# Patient Record
Sex: FEMALE | Race: AMERICAN INDIAN OR ALASKA NATIVE | NOT HISPANIC OR LATINO | Employment: FULL TIME | URBAN - METROPOLITAN AREA
[De-identification: names, ages, dates, MRNs, and addresses within clinical notes are randomized per-mention and may not be internally consistent; named-entity substitution may affect disease eponyms.]

---
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
## 2019-01-08 ENCOUNTER — NURSE TRIAGE (OUTPATIENT)
Dept: NURSING | Facility: CLINIC | Age: 19
End: 2019-01-08

## 2019-01-08 ENCOUNTER — OFFICE VISIT (OUTPATIENT)
Dept: FAMILY MEDICINE | Facility: CLINIC | Age: 19
End: 2019-01-08
Payer: COMMERCIAL

## 2019-01-08 VITALS
BODY MASS INDEX: 26.26 KG/M2 | DIASTOLIC BLOOD PRESSURE: 92 MMHG | SYSTOLIC BLOOD PRESSURE: 142 MMHG | HEART RATE: 101 BPM | TEMPERATURE: 98.2 F | WEIGHT: 153 LBS

## 2019-01-08 DIAGNOSIS — R03.0 ELEVATED BP WITHOUT DIAGNOSIS OF HYPERTENSION: ICD-10-CM

## 2019-01-08 DIAGNOSIS — K21.9 GASTROESOPHAGEAL REFLUX DISEASE, ESOPHAGITIS PRESENCE NOT SPECIFIED: Primary | ICD-10-CM

## 2019-01-08 DIAGNOSIS — Z30.42 SURVEILLANCE FOR INJECTABLE MEDROXYPROGESTERONE/ESTRADIOL: Primary | ICD-10-CM

## 2019-01-08 PROCEDURE — 99214 OFFICE O/P EST MOD 30 MIN: CPT | Performed by: PHYSICIAN ASSISTANT

## 2019-01-08 RX ORDER — SUCRALFATE ORAL 1 G/10ML
1 SUSPENSION ORAL 4 TIMES DAILY
Qty: 420 ML | Refills: 3 | Status: SHIPPED | OUTPATIENT
Start: 2019-01-08 | End: 2019-01-22

## 2019-01-08 RX ORDER — OMEPRAZOLE 40 MG/1
40 CAPSULE, DELAYED RELEASE ORAL DAILY
Qty: 30 CAPSULE | Refills: 0 | Status: SHIPPED | OUTPATIENT
Start: 2019-01-08 | End: 2019-02-07

## 2019-01-08 NOTE — PROGRESS NOTES
SUBJECTIVE:   Alfreda Hernandez is a 18 year old female who presents to clinic today for the following health issues:      GERD/Heartburn      Duration: X 3 week    Description (location/character/radiation): upper abdomen - burning sensation    Intensity:  moderate    Accompanying signs and symptoms:  food getting stuck: no   nausea/vomiting/blood: YES- nausea  abdominal pain: YES  black/tarry or bloody stools: no :    History (similar episodes/previous evaluation): None    Precipitating or alleviating factors:  worse with food.  current NSAID/Aspirin use: no     Therapies tried and outcome: Tums   No dysuria.  No back pain.    Worse with caffeine intake.  Did vomit 2 times in the 3 weeks.  No blood in her vomit.  No dizziness.  No constipation or diarrhea.  Tried Tums without relief.          Allergies   Allergen Reactions     Nkda [No Known Drug Allergies]        Past Medical History:   Diagnosis Date     Acanthosis nigricans      Amblyopia      Bipolar 2 disorder (H) 1/20/2016     Episodic mood disorder (H) 9/28/2015    9/15: Being seen by psychiatry at [facility] and [facility].  On Abilify and something else for her anxiety (she cannot remember which medication.)     PTSD (post-traumatic stress disorder) 1/20/2016         Current Outpatient Medications on File Prior to Visit:  Amphetamine-Dextroamphetamine (ADDERALL PO) Take 20 mg by mouth 2 times daily   ARIPIPRAZOLE PO Take 2 mg by mouth daily   busPIRone (BUSPAR) 15 MG tablet Take 1 tablet (15 mg) by mouth 2 times daily   cyproheptadine (PERIACTIN) 4 MG tablet    ketorolac (TORADOL) 10 MG tablet Take 1 tablet (10 mg) by mouth every 6 hours as needed for moderate pain   LamoTRIgine (LAMICTAL) 250 MG ER tablet Take 250 mg by mouth At Bedtime   medroxyPROGESTERone (DEPO-PROVERA) 150 MG/ML injection Inject 1 mL (150 mg) into the muscle every 3 months     No current facility-administered medications on file prior to visit.     Social History     Tobacco Use      Smoking status: Never Smoker     Smokeless tobacco: Never Used     Tobacco comment: non smoking household   Substance Use Topics     Alcohol use: No     Alcohol/week: 0.0 oz     Drug use: No       ROS:  General: negative for fever  Resp: negative for chest pain   CV: negative for chest pain  ABD: as above  : negative for dysuria  Neurologic:negative for Headache  GI-negative    OBJECTIVE:  BP (!) 142/92   Pulse 101   Temp 98.2  F (36.8  C) (Oral)   Wt 69.4 kg (153 lb)   BMI 26.26 kg/m     General:   awake, alert, and cooperative.  NAD.   Head: Normocephalic, atraumatic.  Eyes: Conjunctiva clear, non icteric.   Heart: Regular rate and rhythm. No murmur.  Lungs: Chest is clear; no wheezes or rales.  ABD: soft, mild epigastric  tenderness to palpation , no rigidity, guarding or rebound , bowel sounds intact  Neuro: Alert and oriented - normal speech.     ASSESSMENT:well appearing    ICD-10-CM    1. Gastroesophageal reflux disease, esophagitis presence not specified K21.9 sucralfate (CARAFATE) 1 GM/10ML suspension     omeprazole (PRILOSEC) 40 MG DR capsule   2. Elevated BP without diagnosis of hypertension R03.0        PLAN: Prilosec and Carafate prescribed.  Avoid caffeine.  Avoid alcohol.     Recheck BP at next visit.  Follow-up with primary 2 weeks.  Advised about symptoms which might herald more serious problems.        Chana White PA-C

## 2019-01-08 NOTE — PATIENT INSTRUCTIONS
Patient Education     GERD (Adult)    The esophagus is a tube that carries food from the mouth to the stomach. A valve (the LES, lower esophageal sphincter) at the lower end of the esophagus prevents stomach acid from flowing upward. When this valve doesn't work properly, stomach contents may repeatedly flow back up (reflux) into the esophagus. This is called gastroesophageal reflux disease (GERD). GERD can irritate the esophagus. It can cause problems with pain, swallowing or breathing. In severe cases, GERD can cause recurrent pneumonia (from aspiration or breathing in particles) or other serious problems.  Symptoms of reflux include burning, pressure or sharp pain in the upper abdomen or mid to lower chest. The pain can spread to the neck, back, or shoulder. There may be belching, an acid taste in the back of the throat, chronic cough, or sore throat, or hoarseness. GERD symptoms often occur during the day after a big meal. They can also occur at night when lying down.   Home care  Lifestyle changes can help reduce symptoms. If needed, your healthcare provider may prescribe medicines. Symptoms often improve with treatment, but if treatment is stopped, the symptoms often return after a few months. So most persons with GERD will need to continue treatment or get treatment on and off.  Lifestyle changes    Limit or avoid fatty, fried, and spicy foods, as well as coffee, chocolate, mint, and foods with high acid content such as tomatoes and citrus fruit and juices (orange, grapefruit, lemon).    Don t eat large meals, especially at night. Frequent, smaller meals are best. Don't lie down right after eating. And don t eat anything 3 hours before going to bed.    Don't drink alcohol or smoke. As much as possible, stay away from second hand smoke.    If you are overweight, losing weight will reduce symptoms.     Don't wear tight clothing around your stomach area.    If your symptoms occur during sleep, use a foam wedge  "to elevate your upper body (not just your head.) Or, place 4\" blocks under the head of your bed. Or use 2 bed risers under your bedframe.  Medicines  If needed, medicines can help relieve the symptoms of GERD and prevent damage to the esophagus. Discuss a medicine plan with your healthcare provider. This may include one or more of the following medicines:    Antacids to help neutralize the normal acids in your stomach.    Acid blockers (Histamine or H2 blockers) to decrease acid production.    Acid inhibitors (proton pump inhibitors PPIs) to decrease acid production in a different way than the blockers. They may work better, but can take a little longer to take effect.  Take an antacid 30 to 60 minutes after eating and at bedtime, but not at the same time as an acid blocker.  Try not to take medicines such as ibuprofen and aspirin. If you are taking aspirin for your heart or other medical reasons, talk to your healthcare provider about stopping it.  Follow-up care  Follow up with your healthcare provider or as advised by our staff.  When to seek medical advice  Call your healthcare provider if any of the following occur:    Stomach pain gets worse or moves to the lower right abdomen (appendix area)    Chest pain appears or gets worse, or spreads to the back, neck, shoulder, or arm    An over-the-counter trial of medicine doesn't relieve your symptoms    Weight loss that can't be explained    Trouble or pain swallowing    Frequent vomiting (can t keep down liquids)    Blood in the stool or vomit (red or black in color)    Feeling weak or dizzy    Fever of 100.4 F (38 C) or higher, or as directed by your healthcare provider  Date Last Reviewed: 3/1/2018    [id number] [employer]. [address] [place] [address], [place] [address]. All rights reserved. This information is not intended as a substitute for professional medical care. Always follow your healthcare professional's instructions.           "

## 2019-01-08 NOTE — TELEPHONE ENCOUNTER
FNA triage call :   Presenting problem : Pt called.  Constant  Burning in upper abdomen started few weeks , worse with eating . Currently : no fever  or injury , but constant , Upper abdominal pain is  6/10 on painscale .   Guideline used :  abd pain - upper - adult   Disposition and recommendations :  NPO , have someone drive you to [facility] ED but Pt prefers appt only and sent to .   Caller verbalizes understanding and denies further questions and will call back if further symptoms to triage or questions  . Josie Driscoll RN  - [facility] Nurse Advisor       Reason for Disposition    [1] SEVERE pain (e.g., excruciating) AND [2] present > 1 hour    Additional Information    Negative: Severe difficulty breathing (e.g., struggling for each breath, speaks in single words)    Negative: Shock suspected (e.g., cold/pale/clammy skin, too weak to stand, low BP, rapid pulse)    Negative: Difficult to awaken or acting confused  (e.g., disoriented, slurred speech)    Negative: Passed out (i.e., lost consciousness, collapsed and was not responding)    Negative: Visible sweat on face or sweat dripping down face    Negative: Sounds like a life-threatening emergency to the triager    Negative: Followed an abdomen (stomach) injury    Negative: Chest pain    Protocols used: ABDOMINAL PAIN - UPPER-ADULT-

## 2019-01-09 ENCOUNTER — ALLIED HEALTH/NURSE VISIT (OUTPATIENT)
Dept: NURSING | Facility: CLINIC | Age: 19
End: 2019-01-09
Payer: COMMERCIAL

## 2019-01-09 VITALS — DIASTOLIC BLOOD PRESSURE: 86 MMHG | BODY MASS INDEX: 26.61 KG/M2 | SYSTOLIC BLOOD PRESSURE: 140 MMHG | WEIGHT: 155 LBS

## 2019-01-09 DIAGNOSIS — Z30.42 SURVEILLANCE FOR INJECTABLE MEDROXYPROGESTERONE/ESTRADIOL: Primary | ICD-10-CM

## 2019-01-09 PROCEDURE — 99207 ZZC NO CHARGE NURSE ONLY: CPT

## 2019-01-09 PROCEDURE — 96372 THER/PROPH/DIAG INJ SC/IM: CPT

## 2019-01-09 RX ORDER — MEDROXYPROGESTERONE ACETATE 150 MG/ML
150 INJECTION, SUSPENSION INTRAMUSCULAR
Status: DISCONTINUED | OUTPATIENT
Start: 2019-01-09 | End: 2019-09-27

## 2019-01-09 RX ADMIN — MEDROXYPROGESTERONE ACETATE 150 MG: 150 INJECTION, SUSPENSION INTRAMUSCULAR at 11:22

## 2019-05-14 ENCOUNTER — OFFICE VISIT (OUTPATIENT)
Dept: PEDIATRICS | Facility: CLINIC | Age: 19
End: 2019-05-14
Payer: COMMERCIAL

## 2019-05-14 VITALS
WEIGHT: 166 LBS | HEIGHT: 64 IN | SYSTOLIC BLOOD PRESSURE: 126 MMHG | BODY MASS INDEX: 28.34 KG/M2 | HEART RATE: 115 BPM | OXYGEN SATURATION: 96 % | TEMPERATURE: 98.5 F | DIASTOLIC BLOOD PRESSURE: 72 MMHG

## 2019-05-14 DIAGNOSIS — Z30.09 BIRTH CONTROL COUNSELING: Primary | ICD-10-CM

## 2019-05-14 PROBLEM — M26.609 TEMPOROMANDIBULAR JOINT DISORDER: Status: ACTIVE | Noted: 2018-02-09

## 2019-05-14 PROBLEM — K11.7 XEROSTOMIA: Status: ACTIVE | Noted: 2018-02-09

## 2019-05-14 PROBLEM — M79.18 MYOFASCIAL PAIN: Status: ACTIVE | Noted: 2018-02-09

## 2019-05-14 PROBLEM — G44.209 TENSION-TYPE HEADACHE: Status: ACTIVE | Noted: 2018-02-09

## 2019-05-14 LAB — HCG UR QL: NEGATIVE

## 2019-05-14 PROCEDURE — 87491 CHLMYD TRACH DNA AMP PROBE: CPT | Performed by: NURSE PRACTITIONER

## 2019-05-14 PROCEDURE — 99214 OFFICE O/P EST MOD 30 MIN: CPT | Mod: 25 | Performed by: NURSE PRACTITIONER

## 2019-05-14 PROCEDURE — 81025 URINE PREGNANCY TEST: CPT | Performed by: NURSE PRACTITIONER

## 2019-05-14 PROCEDURE — 87591 N.GONORRHOEAE DNA AMP PROB: CPT | Performed by: NURSE PRACTITIONER

## 2019-05-14 PROCEDURE — 96372 THER/PROPH/DIAG INJ SC/IM: CPT | Performed by: NURSE PRACTITIONER

## 2019-05-14 RX ORDER — MEDROXYPROGESTERONE ACETATE 150 MG/ML
150 INJECTION, SUSPENSION INTRAMUSCULAR
Status: DISCONTINUED | OUTPATIENT
Start: 2019-05-14 | End: 2019-09-27

## 2019-05-14 RX ADMIN — MEDROXYPROGESTERONE ACETATE 150 MG: 150 INJECTION, SUSPENSION INTRAMUSCULAR at 14:00

## 2019-05-14 ASSESSMENT — MIFFLIN-ST. JEOR: SCORE: 1516.94

## 2019-05-14 NOTE — PATIENT INSTRUCTIONS
Results for orders placed or performed in visit on 05/14/19   HCG Qual, Urine ([id number])   Result Value Ref Range    HCG Qual Urine Negative NEG^Negative     Depo every 3 months    NEXT INJECTION DUE: 7/30/19 - 8/13/19

## 2019-05-14 NOTE — NURSING NOTE
BP: 148/84    LAST PAP/EXAM: No results found for: PAP  URINE HCG:negative    The following medication was given:     MEDICATION: Depo Provera 150mg  ROUTE: IM  SITE: LUQ - Gluteus  : [employer]  LOT #: [id number]  EXP:[id number]  NEXT INJECTION DUE: 7/30/19 - 8/13/19   Provider: [facility]

## 2019-05-14 NOTE — PROGRESS NOTES
SUBJECTIVE:   Alfreda Hernandez is a 18 year old female who presents to clinic today because of:    Chief Complaint   Patient presents with     Contraception        HPI  Concerns: Patient is in clinic to get her depo shot.   NANCY Daily      Here today to restart her Depo she missed her April shot she has been busy and was unable to make it to the clinic.   She is sexually active and sometimes will use a condom not always.  She does want to continue with the Depo and likes how it work.           ROS  GENERAL:  NEGATIVE for fever, poor appetite, and sleep disruption.  SKIN:  NEGATIVE for rash, hives, and eczema.  EYE:  NEGATIVE for pain, discharge, redness, itching and vision problems.  ENT:  NEGATIVE for ear pain, runny nose, congestion and sore throat.  RESP:  NEGATIVE for cough, wheezing, and difficulty breathing.  CARDIAC:  NEGATIVE for chest pain and cyanosis.   GI:  NEGATIVE for vomiting, diarrhea, abdominal pain and constipation.  :  NEGATIVE for urinary problems.  NEURO:  NEGATIVE for headache and weakness.  ALLERGY:  As in Allergy History  MSK:  NEGATIVE for muscle problems and joint problems.    PROBLEM LIST  Patient Active Problem List    Diagnosis Date Noted     Intractable migraine without aura and with status migrainosus 12/01/2016     Priority: Medium     Bipolar 2 disorder (H) 01/20/2016     Priority: Medium     PTSD (post-traumatic stress disorder) 01/20/2016     Priority: Medium     Episodic mood disorder (H) 09/28/2015     Priority: Medium     9/15: Being seen by psychiatry at [facility].  On Abilify and something else for her anxiety (she cannot remember which medication.)       Panic disorder without agoraphobia 05/07/2015     Priority: Medium     Contraception 03/09/2015     Priority: Medium     Moderate recurrent major depression (H) 10/22/2014     Priority: Medium     Anxiety 10/22/2014     Priority: Medium     Panic attack 10/22/2014     Priority: Medium     Vitamin D  "deficiency 10/10/2014     Priority: Medium      MEDICATIONS  Current Outpatient Medications   Medication Sig Dispense Refill     Amphetamine-Dextroamphetamine (ADDERALL PO) Take 20 mg by mouth 2 times daily       ARIPIPRAZOLE PO Take 2 mg by mouth daily       busPIRone (BUSPAR) 15 MG tablet Take 1 tablet (15 mg) by mouth 2 times daily 180 tablet 3     cyproheptadine (PERIACTIN) 4 MG tablet        ketorolac (TORADOL) 10 MG tablet Take 1 tablet (10 mg) by mouth every 6 hours as needed for moderate pain 12 tablet 0     LamoTRIgine (LAMICTAL) 250 MG ER tablet Take 250 mg by mouth At Bedtime       medroxyPROGESTERone (DEPO-PROVERA) 150 MG/ML injection Inject 1 mL (150 mg) into the muscle every 3 months (Patient not taking: Reported on 5/14/2019) 1 mL 3      ALLERGIES  Allergies   Allergen Reactions     Nkda [No Known Drug Allergies]        Reviewed and updated as needed this visit by clinical staff  Tobacco  Allergies  Meds  Med Hx  Surg Hx  Fam Hx         Reviewed and updated as needed this visit by Provider  Tobacco  Med Hx  Surg Hx  Fam Hx       OBJECTIVE:   /72   Pulse 115   Temp 98.5  F (36.9  C) (Oral)   Ht 5' 4.25\" (1.632 m)   Wt 166 lb (75.3 kg)   SpO2 96%   BMI 28.27 kg/m    49 %ile based on CDC (Girls, 2-20 Years) Stature-for-age data based on Stature recorded on 5/14/2019.  90 %ile based on CDC (Girls, 2-20 Years) weight-for-age data based on Weight recorded on 5/14/2019.  90 %ile based on CDC (Girls, 2-20 Years) BMI-for-age based on body measurements available as of 5/14/2019.  Blood pressure percentiles are not available for patients who are 18 years or older.    GENERAL: Active, alert, in no acute distress.  SKIN: Clear. No significant rash, abnormal pigmentation or lesions  HEAD: Normocephalic.  EYES:  No discharge or erythema. Normal pupils and EOM.  EARS: Normal canals. Tympanic membranes are normal; gray and translucent.  NOSE: Normal without discharge.  MOUTH/THROAT: Clear. No " oral lesions. Teeth intact without obvious abnormalities.  NECK: Supple, no masses.  LYMPH NODES: No adenopathy  LUNGS: Clear. No rales, rhonchi, wheezing or retractions  HEART: Regular rhythm. Normal S1/S2. No murmurs.  ABDOMEN: Soft, non-tender, not distended, no masses or hepatosplenomegaly. Bowel sounds normal.     DIAGNOSTICS:   Results for orders placed or performed in visit on 05/14/19 (from the past 24 hour(s))   HCG Qual, Urine ([id number])   Result Value Ref Range    HCG Qual Urine Negative NEG^Negative       ASSESSMENT/PLAN:   (Z30.09) Birth control counseling  (primary encounter diagnosis)  Comment:   Plan: HCG Qual, Urine ([id number]), NEISSERIA GONORRHOEA        PCR, CHLAMYDIA TRACHOMATIS PCR,         medroxyPROGESTERone (DEPO-PROVERA) syringe 150         mg        Will continue Depo every 3 months.   Discussed if interested at any time to try the IUD or Nexplanon then would refer to Josie Angeles NP.      FOLLOW UP: in 3 month(s)    DAVID Kaye, APRN CNP

## 2019-05-15 ENCOUNTER — TELEPHONE (OUTPATIENT)
Dept: PEDIATRICS | Facility: CLINIC | Age: 19
End: 2019-05-15

## 2019-05-15 DIAGNOSIS — A74.9 CHLAMYDIA INFECTION: Primary | ICD-10-CM

## 2019-05-15 LAB
C TRACH DNA SPEC QL NAA+PROBE: POSITIVE
N GONORRHOEA DNA SPEC QL NAA+PROBE: NEGATIVE
SPECIMEN SOURCE: ABNORMAL
SPECIMEN SOURCE: NORMAL

## 2019-05-15 RX ORDER — AZITHROMYCIN 500 MG/1
1000 TABLET, FILM COATED ORAL DAILY
Qty: 2 TABLET | Refills: 0 | Status: SHIPPED | OUTPATIENT
Start: 2019-05-15 | End: 2019-09-04

## 2019-05-15 NOTE — TELEPHONE ENCOUNTER
Kathy from lab called, Patient Alfreda is positive for Chlamydia Please advise.     Kristen Melgar MA

## 2019-05-15 NOTE — TELEPHONE ENCOUNTER
VM left for PATIENT to call clinic and mychart sent with instructions on taking Zithromax and  Sexual partner treatment -- Treatment is important for you and anyone you have had sex with recently (the last 60 days, or the last person you had sex with), whether or not he or she has symptoms or has a negative test for chlamydia. This is because not all infections cause symptoms, and because the test might not detect a recent infection, which could have been the source of your infection. Your doctor or nurse might ask you to tell your sexual partner(s) to be treated. In some cases, your doctor or nurse may give you a prescription for both you and your partner.  You should not have sex until one week passes after both you and your partner have completed treatment. It is possible to be infected with chlamydia more than once, and the most common reason for this is failure to treat sexual partners.      Emma Swift, APRN, CNP

## 2019-05-23 ENCOUNTER — TELEPHONE (OUTPATIENT)
Dept: PEDIATRICS | Facility: CLINIC | Age: 19
End: 2019-05-23

## 2019-05-23 NOTE — TELEPHONE ENCOUNTER
STD reporting form filled out for the [place] Dept of Health and faxed to [phone].    Margaret INFANTEN, RN

## 2019-05-23 NOTE — TELEPHONE ENCOUNTER
----- Message from Isidro Pereira sent at 5/23/2019  2:16 PM CDT -----  Regarding: MD reportable disease  This patient was positive for Chlamydia on 5/14/19. Please report this to MD if it has not already been completed. Thank you.

## 2019-06-17 ENCOUNTER — OFFICE VISIT (OUTPATIENT)
Dept: OBGYN | Facility: CLINIC | Age: 19
End: 2019-06-17
Payer: COMMERCIAL

## 2019-06-17 VITALS
OXYGEN SATURATION: 97 % | HEART RATE: 95 BPM | BODY MASS INDEX: 27.73 KG/M2 | DIASTOLIC BLOOD PRESSURE: 91 MMHG | WEIGHT: 162.8 LBS | SYSTOLIC BLOOD PRESSURE: 145 MMHG

## 2019-06-17 DIAGNOSIS — Z11.3 SCREEN FOR STD (SEXUALLY TRANSMITTED DISEASE): Primary | ICD-10-CM

## 2019-06-17 PROCEDURE — 99202 OFFICE O/P NEW SF 15 MIN: CPT | Performed by: NURSE PRACTITIONER

## 2019-06-17 PROCEDURE — 87591 N.GONORRHOEAE DNA AMP PROB: CPT | Performed by: NURSE PRACTITIONER

## 2019-06-17 PROCEDURE — 87491 CHLMYD TRACH DNA AMP PROBE: CPT | Performed by: NURSE PRACTITIONER

## 2019-06-17 NOTE — PROGRESS NOTES
Subjective     Alfreda Hernandez is a 18 year old female who presents to clinic today for the following health issues:    HPI   Follow up Chlamydia.  Patient was seen a month ago by Pediatrics and had STI screening-positive for Chlamydia. Was treated and per patient, partner received treatment from his provider. Denies abnormal vaginal discharge, pelvic pain, nausea, fever. Declines need for screening for other STI except gonorrhea and chlamydia.  Took the medication and had no adverse side effects. No other concerns today.  Using condoms on occasion.  Depo Provera for contraception.  Blood pressures have been elevated on last few visits.    Patient Active Problem List   Diagnosis     Vitamin D deficiency     Moderate recurrent major depression (H)     Anxiety     Panic attack     Contraception     Panic disorder without agoraphobia     Episodic mood disorder (H)     Bipolar 2 disorder (H)     PTSD (post-traumatic stress disorder)     Intractable migraine without aura and with status migrainosus     Myofascial pain     Temporomandibular joint disorder     Tension-type headache     Xerostomia     Past Surgical History:   Procedure Laterality Date     PE TUBES       TONSILLECTOMY, ADENOIDECTOMY, COMBINED Bilateral 12/26/2016    Procedure: COMBINED TONSILLECTOMY, ADENOIDECTOMY;  Surgeon: Olvin Raymond MD;  Location:  OR       Social History     Tobacco Use     Smoking status: Never Smoker     Smokeless tobacco: Never Used     Tobacco comment: non smoking household   Substance Use Topics     Alcohol use: No     Alcohol/week: 0.0 oz     Family History   Problem Relation Age of Onset     Asthma Mother      Thyroid Disease Mother         resolved shortly after pregnancy     Other - See Comments Mother         Dx with Lupus on 11/2014     Hypertension Father      Diabetes Maternal Grandfather      Heart Disease Other         maternal great grandparents     Breast Cancer Other         maternal great aunt      Cerebrovascular Disease No family hx of      Glaucoma No family hx of      Macular Degeneration No family hx of            Reviewed and updated as needed this visit by Provider  Tobacco  Allergies  Meds  Problems  Med Hx  Surg Hx  Fam Hx  Soc Hx          Review of Systems   ROS COMP: Constitutional, HEENT, cardiovascular, pulmonary, gi and gu systems are negative, except as otherwise noted.      Objective    BP (!) 145/91   Pulse 95   Wt 73.8 kg (162 lb 12.8 oz)   SpO2 97%   BMI 27.73 kg/m    Body mass index is 27.73 kg/m .  Physical Exam   GENERAL: healthy, alert and no distress   (female): normal female external genitalia, normal urethral meatus, vaginal mucosa, normal cervix/adnexa/uterus without masses or discharge  MS: no gross musculoskeletal defects noted, no edema  SKIN: no suspicious lesions or rashes  PSYCH: mentation appears normal, affect normal/bright    Assessment & Plan     1. Screen for STD (sexually transmitted disease)  Await results. Encouraged consistent condom use.   Also, recommend follow up with PCP due to elevated blood pressures on several occasions.  - Chlamydia trachomatis PCR  - Neisseria gonorrhoeae PCR     CHEKO Schmitt CNP  [facility]

## 2019-06-18 LAB
C TRACH DNA SPEC QL NAA+PROBE: NEGATIVE
N GONORRHOEA DNA SPEC QL NAA+PROBE: NEGATIVE
SPECIMEN SOURCE: NORMAL
SPECIMEN SOURCE: NORMAL

## 2019-08-20 ENCOUNTER — ALLIED HEALTH/NURSE VISIT (OUTPATIENT)
Dept: NURSING | Facility: CLINIC | Age: 19
End: 2019-08-20
Payer: COMMERCIAL

## 2019-08-20 VITALS
WEIGHT: 156 LBS | RESPIRATION RATE: 16 BRPM | SYSTOLIC BLOOD PRESSURE: 99 MMHG | DIASTOLIC BLOOD PRESSURE: 70 MMHG | HEART RATE: 77 BPM | TEMPERATURE: 99.1 F | BODY MASS INDEX: 26.57 KG/M2 | OXYGEN SATURATION: 95 %

## 2019-08-20 LAB — HCG UR QL: NEGATIVE

## 2019-08-20 PROCEDURE — 96372 THER/PROPH/DIAG INJ SC/IM: CPT

## 2019-08-20 PROCEDURE — 81025 URINE PREGNANCY TEST: CPT | Performed by: PEDIATRICS

## 2019-08-20 PROCEDURE — 99207 ZZC NO CHARGE NURSE ONLY: CPT

## 2019-08-20 RX ADMIN — MEDROXYPROGESTERONE ACETATE 150 MG: 150 INJECTION, SUSPENSION INTRAMUSCULAR at 18:25

## 2019-08-20 NOTE — PROGRESS NOTES
Clinic Administered Medication Documentation      Depo Provera Documentation    Prior to injection, verified patient identity using patient's name and date of birth. Medication was administered. Please see MAR and medication order for additional information. Patient instructed to remain in clinic for 15 minutes and report any adverse reaction to staff immediately .    BP: 99/70    LAST PAP/EXAM: No results found for: PAP  URINE HCG:negative    NEXT INJECTION DUE: 11/5/19 - 11/19/19    Was entire vial of medication used? Yes  Vial/Syringe: Single dose vial  Expiration Date:  06/2020    BP 99/70   Pulse 77   Temp 99.1  F (37.3  C) (Tympanic)   Resp 16   Wt 70.8 kg (156 lb)   SpO2 95%   BMI 26.57 kg/m        Padmini Martin, CMA

## 2019-08-24 ENCOUNTER — OFFICE VISIT (OUTPATIENT)
Dept: URGENT CARE | Facility: URGENT CARE | Age: 19
End: 2019-08-24
Payer: COMMERCIAL

## 2019-08-24 VITALS
RESPIRATION RATE: 18 BRPM | DIASTOLIC BLOOD PRESSURE: 70 MMHG | OXYGEN SATURATION: 98 % | BODY MASS INDEX: 26.09 KG/M2 | SYSTOLIC BLOOD PRESSURE: 130 MMHG | TEMPERATURE: 98.8 F | HEART RATE: 95 BPM | WEIGHT: 153.2 LBS

## 2019-08-24 DIAGNOSIS — R03.0 ELEVATED BLOOD PRESSURE READING WITHOUT DIAGNOSIS OF HYPERTENSION: ICD-10-CM

## 2019-08-24 DIAGNOSIS — R07.0 THROAT PAIN: Primary | ICD-10-CM

## 2019-08-24 DIAGNOSIS — H66.002 ACUTE SUPPURATIVE OTITIS MEDIA OF LEFT EAR WITHOUT SPONTANEOUS RUPTURE OF TYMPANIC MEMBRANE, RECURRENCE NOT SPECIFIED: ICD-10-CM

## 2019-08-24 LAB
DEPRECATED S PYO AG THROAT QL EIA: NORMAL
SPECIMEN SOURCE: NORMAL

## 2019-08-24 PROCEDURE — 99214 OFFICE O/P EST MOD 30 MIN: CPT | Performed by: FAMILY MEDICINE

## 2019-08-24 PROCEDURE — 87880 STREP A ASSAY W/OPTIC: CPT | Performed by: FAMILY MEDICINE

## 2019-08-24 PROCEDURE — 87081 CULTURE SCREEN ONLY: CPT | Performed by: FAMILY MEDICINE

## 2019-08-24 RX ORDER — AMOXICILLIN 500 MG/1
1000 TABLET, FILM COATED ORAL 2 TIMES DAILY
Qty: 40 TABLET | Refills: 0 | Status: SHIPPED | OUTPATIENT
Start: 2019-08-24 | End: 2019-09-04

## 2019-08-24 NOTE — PROGRESS NOTES
Chief complaint: sore throat    Denies any possibility of pregnancy declined a pregnancy test    2 days ago felt dry throat and scratchy  Today hurts more   Both ears hurt too  Stuffy and congestion   Feeling feverish not documented  Patient took tylenol today  BP elevated today but asymptomatic. No headache no blurring of vision no chest pain no shortness of breath no dizziness no unsteadiness no numbness no weakness  able to swallow liquids and solids -YES  other symptoms above  Rash: No  Has tried over the counter medications no relief  because of persistence, patient came in to be seen.    ROS:  denies any exertional chest pain or shortness of breath  denies any unusual rash or joint swelling  denies post-tussive emesis or pertussis like symptoms  Negative for constitutional, eye, ear, nose, throat, skin, respiratory, cardiac, and gastrointestinal other than those outlined in the HPI.    PMH: chart reviewed  FH: chart reviewed    SH: chart reviewed and as above   Physical Exam:   /70   Pulse 95   Temp 98.8  F (37.1  C) (Oral)   Resp 18   Wt 69.5 kg (153 lb 3.2 oz)   SpO2 98%   BMI 26.09 kg/m    General : Awake Alert not in any acute cardiorespiratory distress  Head:       Normocephalic Atraumatic  Eyes:    Pupils equally reactive to light and accomodation. Sclera not icteric.   ENT:   midline nasal septum, mild nasal congestion, sinuses non-tender  left ear: no tragal tenderness, no mastoid tenderness, normal EAC, erythematous bulging tympaninc membrane   right ear: left ear: no tragal tenderness, no mastoid tenderness, normal EAC, normal TM  mouth moist buccal mucosa, Yes hyperemic posterior pharyngeal wall, no trismus  tonsils: tonsils are absent  anterior cervical nodes: Yes tender  posterior cervical nodes: No  palpable  Heart:  Regular in rate and rhythm, no murmurs rubs or gallops  Lungs: Symmetrical Chest Expansion, no retractions, clear breath sounds  Abdomen: soft, no  hepatosplenomegally  Psych: Appropriate mood and affect. Pleasant  No thoughts of harming self or others  Feels safe   Skin: patient undressed to level of his/her comfort. No visible concerning lesions.    Labs: Strep negative       ICD-10-CM    1. Throat pain R07.0 Strep, Rapid Screen     Beta strep group A culture   2. Elevated blood pressure reading without diagnosis of hypertension R03.0    3. Acute suppurative otitis media of left ear without spontaneous rupture of tympanic membrane, recurrence not specified H66.002 amoxicillin (AMOXIL) 500 MG tablet     Prescribed with amoxicillin   supportive treatment: advised supportive treatment, Advised to come back in if with any worsening symptoms or if not better despite supportive measures. Especially if with any worsening sore throat, inability to eat or drink or swallow, or trismus. Symptoms of peritonsillar abscess discussed. Patient voiced understanding.  adverse reactions of medication discussed  OTC medications discussed  advised to come back in right away if with any worsening symptoms or if with no relief despite treatment plan  patient voiced understanding and had no further questions at this time.    Your blood pressure reading was elevated today.  Recommend that you have it re-checked either at home or at our clinic within a week  Avoid cold medicines that have pseudoephedrin in it, or Sudafed. You may take regular or plain Robitussin or Mucinex  If you are unsure, please ask the pharmacist    If your blood pressure top number (systolic) 180 and above, or the bottom number (diastolic) 120 and above, you need to be seen immediately.  If persistently elevated top number 140 and above, or the bottom number 90 and above, please schedule an appointment to see a provider in clinic within a week.  If you have very elevated blood pressures, accompanied by headache, chest pain, numbness, weakness, slurring of speech, confusion, difficulty walking, call 911 and go to  the ER

## 2019-08-25 LAB
BACTERIA SPEC CULT: NORMAL
SPECIMEN SOURCE: NORMAL

## 2019-09-04 ENCOUNTER — OFFICE VISIT (OUTPATIENT)
Dept: PEDIATRICS | Facility: CLINIC | Age: 19
End: 2019-09-04
Payer: COMMERCIAL

## 2019-09-04 VITALS
TEMPERATURE: 98.8 F | WEIGHT: 151 LBS | SYSTOLIC BLOOD PRESSURE: 124 MMHG | DIASTOLIC BLOOD PRESSURE: 84 MMHG | BODY MASS INDEX: 25.78 KG/M2 | HEART RATE: 92 BPM | HEIGHT: 64 IN

## 2019-09-04 DIAGNOSIS — R03.0 ELEVATED BLOOD-PRESSURE READING WITHOUT DIAGNOSIS OF HYPERTENSION: ICD-10-CM

## 2019-09-04 DIAGNOSIS — M54.41 CHRONIC BILATERAL LOW BACK PAIN WITH BILATERAL SCIATICA: Primary | ICD-10-CM

## 2019-09-04 DIAGNOSIS — G89.29 CHRONIC BILATERAL LOW BACK PAIN WITH BILATERAL SCIATICA: Primary | ICD-10-CM

## 2019-09-04 DIAGNOSIS — G43.809 OTHER MIGRAINE WITHOUT STATUS MIGRAINOSUS, NOT INTRACTABLE: ICD-10-CM

## 2019-09-04 DIAGNOSIS — M54.42 CHRONIC BILATERAL LOW BACK PAIN WITH BILATERAL SCIATICA: Primary | ICD-10-CM

## 2019-09-04 LAB
ALBUMIN SERPL-MCNC: 4.1 G/DL (ref 3.4–5)
ALBUMIN UR-MCNC: NEGATIVE MG/DL
ALP SERPL-CCNC: 163 U/L (ref 40–150)
ALT SERPL W P-5'-P-CCNC: 25 U/L (ref 0–50)
AMORPH CRY #/AREA URNS HPF: ABNORMAL /HPF
ANION GAP SERPL CALCULATED.3IONS-SCNC: 6 MMOL/L (ref 3–14)
APPEARANCE UR: ABNORMAL
AST SERPL W P-5'-P-CCNC: 16 U/L (ref 0–35)
BASOPHILS # BLD AUTO: 0 10E9/L (ref 0–0.2)
BASOPHILS NFR BLD AUTO: 0.3 %
BILIRUB SERPL-MCNC: 0.3 MG/DL (ref 0.2–1.3)
BILIRUB UR QL STRIP: NEGATIVE
BUN SERPL-MCNC: 14 MG/DL (ref 7–30)
CALCIUM SERPL-MCNC: 9.3 MG/DL (ref 8.5–10.1)
CHLORIDE SERPL-SCNC: 107 MMOL/L (ref 96–110)
CHOLEST SERPL-MCNC: 179 MG/DL
CO2 SERPL-SCNC: 25 MMOL/L (ref 20–32)
COLOR UR AUTO: YELLOW
CREAT SERPL-MCNC: 0.65 MG/DL (ref 0.5–1)
DIFFERENTIAL METHOD BLD: NORMAL
EOSINOPHIL # BLD AUTO: 0.1 10E9/L (ref 0–0.7)
EOSINOPHIL NFR BLD AUTO: 1 %
ERYTHROCYTE [DISTWIDTH] IN BLOOD BY AUTOMATED COUNT: 12.2 % (ref 10–15)
GFR SERPL CREATININE-BSD FRML MDRD: >90 ML/MIN/{1.73_M2}
GLUCOSE SERPL-MCNC: 89 MG/DL (ref 70–99)
GLUCOSE UR STRIP-MCNC: NEGATIVE MG/DL
HBA1C MFR BLD: 5.2 % (ref 0–5.6)
HCT VFR BLD AUTO: 44.5 % (ref 35–47)
HDLC SERPL-MCNC: 51 MG/DL
HGB BLD-MCNC: 14.7 G/DL (ref 11.7–15.7)
HGB UR QL STRIP: NEGATIVE
KETONES UR STRIP-MCNC: NEGATIVE MG/DL
LDLC SERPL CALC-MCNC: 113 MG/DL
LEUKOCYTE ESTERASE UR QL STRIP: NEGATIVE
LYMPHOCYTES # BLD AUTO: 2.2 10E9/L (ref 0.8–5.3)
LYMPHOCYTES NFR BLD AUTO: 24 %
MCH RBC QN AUTO: 29.9 PG (ref 26.5–33)
MCHC RBC AUTO-ENTMCNC: 33 G/DL (ref 31.5–36.5)
MCV RBC AUTO: 90 FL (ref 78–100)
MONOCYTES # BLD AUTO: 0.4 10E9/L (ref 0–1.3)
MONOCYTES NFR BLD AUTO: 4.7 %
NEUTROPHILS # BLD AUTO: 6.4 10E9/L (ref 1.6–8.3)
NEUTROPHILS NFR BLD AUTO: 70 %
NITRATE UR QL: NEGATIVE
NON-SQ EPI CELLS #/AREA URNS LPF: ABNORMAL /LPF
NONHDLC SERPL-MCNC: 128 MG/DL
PH UR STRIP: 7 PH (ref 5–7)
PLATELET # BLD AUTO: 313 10E9/L (ref 150–450)
POTASSIUM SERPL-SCNC: 3.9 MMOL/L (ref 3.4–5.3)
PROT SERPL-MCNC: 8 G/DL (ref 6.8–8.8)
RBC # BLD AUTO: 4.92 10E12/L (ref 3.8–5.2)
RBC #/AREA URNS AUTO: ABNORMAL /HPF
SODIUM SERPL-SCNC: 138 MMOL/L (ref 133–144)
SOURCE: ABNORMAL
SP GR UR STRIP: 1.01 (ref 1–1.03)
TRIGL SERPL-MCNC: 76 MG/DL
TSH SERPL DL<=0.005 MIU/L-ACNC: 0.8 MU/L (ref 0.4–4)
UROBILINOGEN UR STRIP-ACNC: 0.2 EU/DL (ref 0.2–1)
WBC # BLD AUTO: 9.1 10E9/L (ref 4–11)
WBC #/AREA URNS AUTO: ABNORMAL /HPF

## 2019-09-04 PROCEDURE — 99214 OFFICE O/P EST MOD 30 MIN: CPT | Performed by: NURSE PRACTITIONER

## 2019-09-04 PROCEDURE — 80053 COMPREHEN METABOLIC PANEL: CPT | Performed by: NURSE PRACTITIONER

## 2019-09-04 PROCEDURE — 83036 HEMOGLOBIN GLYCOSYLATED A1C: CPT | Performed by: NURSE PRACTITIONER

## 2019-09-04 PROCEDURE — 99000 SPECIMEN HANDLING OFFICE-LAB: CPT | Performed by: NURSE PRACTITIONER

## 2019-09-04 PROCEDURE — 84244 ASSAY OF RENIN: CPT | Mod: 90 | Performed by: NURSE PRACTITIONER

## 2019-09-04 PROCEDURE — 80061 LIPID PANEL: CPT | Performed by: NURSE PRACTITIONER

## 2019-09-04 PROCEDURE — 36415 COLL VENOUS BLD VENIPUNCTURE: CPT | Performed by: NURSE PRACTITIONER

## 2019-09-04 PROCEDURE — 86160 COMPLEMENT ANTIGEN: CPT | Mod: 59 | Performed by: NURSE PRACTITIONER

## 2019-09-04 PROCEDURE — 84443 ASSAY THYROID STIM HORMONE: CPT | Performed by: NURSE PRACTITIONER

## 2019-09-04 PROCEDURE — 85025 COMPLETE CBC W/AUTO DIFF WBC: CPT | Performed by: NURSE PRACTITIONER

## 2019-09-04 PROCEDURE — 81001 URINALYSIS AUTO W/SCOPE: CPT | Performed by: NURSE PRACTITIONER

## 2019-09-04 RX ORDER — DULOXETIN HYDROCHLORIDE 30 MG/1
CAPSULE, DELAYED RELEASE ORAL
Refills: 2 | COMMUNITY
Start: 2019-07-22

## 2019-09-04 RX ORDER — KETOROLAC TROMETHAMINE 10 MG/1
10 TABLET, FILM COATED ORAL EVERY 6 HOURS PRN
Qty: 12 TABLET | Refills: 1 | Status: SHIPPED | OUTPATIENT
Start: 2019-09-04 | End: 2020-02-19

## 2019-09-04 RX ORDER — DULOXETIN HYDROCHLORIDE 60 MG/1
CAPSULE, DELAYED RELEASE ORAL
Refills: 3 | COMMUNITY
Start: 2019-07-22

## 2019-09-04 RX ORDER — PROPRANOLOL HYDROCHLORIDE 40 MG/1
40 TABLET ORAL 2 TIMES DAILY
Qty: 60 TABLET | Refills: 1 | Status: SHIPPED | OUTPATIENT
Start: 2019-09-04 | End: 2020-01-27

## 2019-09-04 ASSESSMENT — MIFFLIN-ST. JEOR: SCORE: 1444.93

## 2019-09-04 NOTE — PATIENT INSTRUCTIONS
[facility] number is [phone]  Get your renal ultrasound in the next few days and once completed then start the propranolol twice daily    Follow up in 2 months

## 2019-09-04 NOTE — PROGRESS NOTES
Subjective    Alfreda Hernandez is a 18 year old female who presents to clinic today with self because of:  Hypertension and Back Pain     HPI   Concerns: blood pressure concern and back pain      Here today for concerns about her blood pressure as it has been high off and on  for a long time.  It is really inconsistent with it begin elevated.  Dad and his family all have high blood pressure and they are Native.      Concerns: back pain    She did see neurology and has physical therapy for her migraines and her neck.  She was experiencing lower back issues and the therapist told her that was sometime with her lower back but she was there for neck therapy so could not work on lower back.  Occasionally she will get pain down her leg to her on both sides, now the left side hurts more.  She is in lower back pain every day and worse in the AM and after she does a lot of activity waling or any activity for a long time.   No hx of back injuries.         Concerns:  Still gets migraines a lot.  Uses the Toradol when her headaches are really bad.      Review of Systems  GENERAL:  NEGATIVE for fever, poor appetite, and sleep disruption.  SKIN:  NEGATIVE for rash, hives, and eczema.  EYE:  NEGATIVE for pain, discharge, redness, itching and vision problems.  ENT:  As in HPI  RESP:  NEGATIVE for cough, wheezing, and difficulty breathing.  CARDIAC:  NEGATIVE for chest pain and cyanosis.   GI:  NEGATIVE for vomiting, diarrhea, abdominal pain and constipation.  :  NEGATIVE for urinary problems.  NEURO:  As in HPI  ALLERGY:  As in Allergy History  MSK:  As in HPI    Problem List  Patient Active Problem List    Diagnosis Date Noted     Myofascial pain 02/09/2018     Priority: Medium     Temporomandibular joint disorder 02/09/2018     Priority: Medium     Tension-type headache 02/09/2018     Priority: Medium     Xerostomia 02/09/2018     Priority: Medium     Bipolar 2 disorder (H) 01/20/2016     Priority: Medium     PTSD (post-traumatic  "stress disorder) 01/20/2016     Priority: Medium     Episodic mood disorder (H) 09/28/2015     Priority: Medium     9/15: Being seen by psychiatry at [facility].  On Abilify and something else for her anxiety (she cannot remember which medication.)       Panic disorder without agoraphobia 05/07/2015     Priority: Medium     Contraception 03/09/2015     Priority: Medium     Moderate recurrent major depression (H) 10/22/2014     Priority: Medium     Anxiety 10/22/2014     Priority: Medium     Panic attack 10/22/2014     Priority: Medium     Vitamin D deficiency 10/10/2014     Priority: Medium      Medications    Current Outpatient Medications on File Prior to Visit:  Amphetamine-Dextroamphetamine (ADDERALL PO) Take 20 mg by mouth 2 times daily   LamoTRIgine (LAMICTAL) 250 MG ER tablet Take 250 mg by mouth At Bedtime   DULoxetine (CYMBALTA) 30 MG capsule TAKE 1 CAPSULE BY MOUTH ONCE DAILY IN ADDITION TO THE 60MG CAPSULE AT BEDTIME FOR A TOTAL DOSE OF 90MG DAILY.   DULoxetine (CYMBALTA) 60 MG capsule TAKE 1 CAPSULE BY MOUTH ONCE DAILY     Current Facility-Administered Medications on File Prior to Visit:  medroxyPROGESTERone (DEPO-PROVERA) injection 150 mg   medroxyPROGESTERone (DEPO-PROVERA) syringe 150 mg     Allergies  Allergies   Allergen Reactions     Nkda [No Known Drug Allergies]      Reviewed and updated as needed this visit by Provider  Tobacco  Allergies  Meds  Problems  Med Hx  Surg Hx  Fam Hx           Objective    /84   Pulse 92   Temp 98.8  F (37.1  C) (Oral)   Ht 5' 4\" (1.626 m)   Wt 151 lb (68.5 kg)   BMI 25.92 kg/m    81 %ile based on CDC (Girls, 2-20 Years) weight-for-age data based on Weight recorded on 9/4/2019.     BP Readings from Last 6 Encounters:   09/04/19 124/84   08/24/19 130/70   08/20/19 99/70   06/17/19 (!) 145/91   05/14/19 126/72   01/09/19 140/86       Physical Exam  GENERAL APPEARANCE: healthy, alert and no distress  EYES: Eyes grossly normal to inspection, " PERRL and conjunctivae and sclerae normal  HENT: ear canals and TM's normal and nose and mouth without ulcers or lesions  NECK: no adenopathy, no asymmetry, masses, or scars and thyroid normal to palpation  RESP: lungs clear to auscultation - no rales, rhonchi or wheezes  CV: regular rates and rhythm, normal S1 S2, no S3 or S4 and no murmur, click or rub  LYMPHATICS: no cervical adenopathy  ORTHO: Lumber/Thoracic Spine Exam: Tender:  Across low back  Range of Motion:  left lateral thoracic bending   decreased, painful, right lateral thoracic bending  decreased, painful, left thoracic rotation  decreased, painful, right thoracic rotation  decreased, painful, lumbar flexion  decreased, painful, lumbar extension  decreased, painful  NEURO: Normal strength and tone, mentation intact, speech normal, DTR symmetrically normal in lower extremities, gait normal including heel/toe/tandem walking, cranial nerves 2-12 intact, Romberg negative, light touch normal and finger to nose normal.  PSYCH: mentation appears normal and affect normal/bright    Diagnostics: None      Assessment & Plan    1. Chronic bilateral low back pain with bilateral sciatica  Will have her see Physical therapy for this and did give her some exercises for piriformis muscle stretching as this too may be impacting her sciatica.  - CASE PT, HAND, AND CHIROPRACTIC REFERRAL; Future    2. Elevated blood-pressure reading without diagnosis of hypertension  Labs and referral to Nephrology as hypertension is hereditary in family.  - Lipid panel reflex to direct LDL Non-fasting  - CBC with platelets differential  - Renin activity  - TSH with free T4 reflex  - US Renal Limited; Future  - UA with Microscopic  - Hemoglobin A1c  - Comprehensive metabolic panel (BMP + Alb, Alk Phos, ALT, AST, Total. Bili, TP)  - Complement C3  - Complement C4    3. Other migraine without status migrainosus, not intractable  Will try propranolol for migraine prophylaxis and to start once  she has completed her renal ultrasound.  Will have her see neurology.    - NEUROLOGY ADULT REFERRAL  - propranolol (INDERAL) 40 MG tablet; Take 1 tablet (40 mg) by mouth 2 times daily  Dispense: 60 tablet; Refill: 1  - ketorolac (TORADOL) 10 MG tablet; Take 1 tablet (10 mg) by mouth every 6 hours as needed for moderate pain  Dispense: 12 tablet; Refill: 1    Follow Up  Return in about 2 months (around 11/4/2019) for recheck.  If not improving or if worsening  Face to Face time greater than 25 min with greater than 50 % in counseling on back pain, high blood pressure and migraines and treatment options.      Emma Swift, PNP, APRN CNP

## 2019-09-05 LAB
C3 SERPL-MCNC: 120 MG/DL (ref 76–169)
C4 SERPL-MCNC: 17 MG/DL (ref 15–50)

## 2019-09-07 LAB — RENIN PLAS-CCNC: 2.3 NG/ML/HR

## 2019-09-11 ENCOUNTER — ANCILLARY PROCEDURE (OUTPATIENT)
Dept: ULTRASOUND IMAGING | Facility: CLINIC | Age: 19
End: 2019-09-11
Attending: NURSE PRACTITIONER
Payer: COMMERCIAL

## 2019-09-11 DIAGNOSIS — R03.0 ELEVATED BLOOD-PRESSURE READING WITHOUT DIAGNOSIS OF HYPERTENSION: ICD-10-CM

## 2019-09-11 PROCEDURE — 76770 US EXAM ABDO BACK WALL COMP: CPT

## 2019-09-12 ENCOUNTER — THERAPY VISIT (OUTPATIENT)
Dept: PHYSICAL THERAPY | Facility: CLINIC | Age: 19
End: 2019-09-12
Attending: NURSE PRACTITIONER
Payer: COMMERCIAL

## 2019-09-12 DIAGNOSIS — M54.42 CHRONIC BILATERAL LOW BACK PAIN WITH BILATERAL SCIATICA: Primary | ICD-10-CM

## 2019-09-12 DIAGNOSIS — M54.41 CHRONIC BILATERAL LOW BACK PAIN WITH BILATERAL SCIATICA: Primary | ICD-10-CM

## 2019-09-12 DIAGNOSIS — G89.29 CHRONIC BILATERAL LOW BACK PAIN WITH BILATERAL SCIATICA: Primary | ICD-10-CM

## 2019-09-12 PROCEDURE — 97112 NEUROMUSCULAR REEDUCATION: CPT | Mod: GP | Performed by: PHYSICAL THERAPIST

## 2019-09-12 PROCEDURE — 97162 PT EVAL MOD COMPLEX 30 MIN: CPT | Mod: GP | Performed by: PHYSICAL THERAPIST

## 2019-09-12 NOTE — Clinical Note
Just a heads up: you are going to be seeing this patient for f/u the next 2 weeks.  Chronic LBP.   Appears to be non-mechanical.  Very hypermobile.  Needs core stabilization.  Should be pretty straight forward.Kimberli Nelson

## 2019-09-12 NOTE — PROGRESS NOTES
[facility] Initial Evaluation  Subjective:  [facility]   [facility] Initial Evaluation -- Lumbar    Date: September 12, 2019  Alfreda Hernandez is a 18 year old female with a low back condition.   Referral: Emma Swift CNP  Work mechanical stresses:  Prolonged standing, lifting  Employment status:  Works part-time as a  on the weekend  Leisure mechanical stresses: none  Functional disability score (GEORGE/STarT Back):  GEORGE 22%,  Michi 7 (Medium Risk)  VAS score (0-10): 7/10  Patient goals/expectations:  To learn how to fix posture,strengthen low back, and improve pain    HISTORY:    Present symptoms: bilateral low back/sacral pain; L>R posterior thigh to mid-calf  Pain quality (sharp/shooting/stabbing/aching/burning/cramping): sharp, stabbing, aching   Paresthesia (yes/no):  No    Present since (onset date): 2016     Symptoms (improving/unchanging/worsening):  worsening     Symptoms commenced as a result of: unknown reasons   Condition occurred in the following environment:   In the community (sitting in school)    Symptoms at onset (back/thigh/leg): bilateral sacral region/tailbone  Constant symptoms (back/thigh/leg): None  Intermittent symptoms (back/thigh/leg): low back, buttock, and thigh    Symptoms are made worse with the following: Always Standing and Always Walking; sometimes lying; always sitting upright   Symptoms are made better with the following: sometimes bending, sometimes stretching    Disturbed sleep (yes/no):  yes Sleeping postures (prone/sup/side R/L): supine    Previous episodes (0/1-5/6-10/11+): 0 Year of first episode: 2016    Previous history: None  Previous treatments:  none      Specific Questions:  Cough/Sneeze/Strain (pos/neg): neg  Bowel/Bladder (normal/abnormal): neg  Gait (normal/abnormal): normal  Medications (nil/NSAIDS/analg/steroids/anticoag/other):   Adderall, Cymbalta, Lamictal, birth control  Medical allergies:  none  General  "health (excellent/good/fair/poor):  Poor  Pertinent medical history:  High blood pressure, Migraines/Headaches, Overweight and Bi-Polar  Imaging (None/Xray/MRI/Other):  none  Recent or major surgery (yes/no):  no  Night pain (yes/no): yes  Accidents (yes/no): no  Unexplained weight loss (yes/no): no  Barriers at home: none  Other red flags: none    EXAMINATION    Posture:   Sitting (good/fair/poor): fair  Standing (good/fair/poor):Good  Lordosis (red/acc/normal): Normal  Correction of posture (better/worse/no effect): worse    Lateral Shift (right/left/nil): nil  Relevant (yes/no):  no  Other Observations: None     Neurological:    Motor deficit:  WNL  Reflexes:   Brisk and symmetrical bilat LE  Sensory deficit:  NT  Dural signs:  Negative Slump, SLR bilat     Movement Loss:   Vicente Mod Min Nil Pain   Flexion    X Bilateral LBP   Extension    X Bilateral LBP   Side Gliding R    X Left hip, central LB, R hip   Side Gliding L    X R LB & Hip     Test Movements:   During: produces, abolishes, increases, decreases, no effect, centralizing, peripheralizing   After: better, worse, no better, no worse, no effect, centralized, peripheralized    Pretest symptoms standing: central LBP   Symptoms During Symptoms After ROM increased ROM decreased No Effect   FIS No Effect          Rep FIS Increases    Worse      X   EIS Increases          Rep EIS Increases    Worse      X     Pretest symptoms lying: central LBP    Symptoms During Symptoms After ROM increased ROM decreased No Effect   RAJI No Effect          Rep RAJI Decreases    \"Slightly\" Better      X   EIL Increases          Rep EIL Increases    Worse      X       Static Tests:  Sitting slouched:  NT  Sitting erect:  NT  Standing slouched NT  Standing erect:  NT  Lying prone in extension:  NT Long sitting:  NTLow Back    Other Tests: Hip screen negative; SLR > 90 deg bilat, general hypermobility bilateral hips and LE's    Core Strength:  TrAbominis 2-/5    Provisional " Classification:  Inconclusive/Other - Mechanically Inconclusive    Principle of Management:  Education:  Slouch-overcorrect; neutral sitting posture     Equipment provided:  None  Mechanical therapy (Y/N):  N    Extension principle:  N  Lateral Principle:  N  Flexion principle:  Y   Other:  Core stabilization    ASSESSMENT/PLAN:    Patient is a 18 year old female with lumbar complaints.    Patient has the following significant findings with corresponding treatment plan.                Diagnosis 1:  LBP   Pain -  self management, education and home program  Decreased strength - therapeutic exercise and therapeutic activities  Impaired muscle performance - neuro re-education  Decreased function - therapeutic activities and home program  Impaired posture - neuro re-education    Therapy Evaluation Codes:   1) History comprised of:   Personal factors that impact the plan of care:      Time since onset of symptoms.    Comorbidity factors that impact the plan of care are:      High blood pressure, Migraines/headaches, Pain at night/rest and Bipolar Disorder, ADHD.     Medications impacting care: High blood pressure and Adderall, Cymbalta, Lamictal, birth control.  2) Examination of Body Systems comprised of:   Body structures and functions that impact the plan of care:      Lumbar spine.   Activity limitations that impact the plan of care are:      Standing and Walking.  3) Clinical presentation characteristics are:   Evolving/Changing.  4) Decision-Making    Moderate complexity using standardized patient assessment instrument and/or measureable assessment of functional outcome.  Cumulative Therapy Evaluation is: Moderate complexity.    Previous and current functional limitations:  (See Goal Flow Sheet for this information)    Short term and Long term goals: (See Goal Flow Sheet for this information)     Communication ability:  Patient appears to be able to clearly communicate and understand verbal and written  communication and follow directions correctly.  Treatment Explanation - The following has been discussed with the patient:   RX ordered/plan of care  Anticipated outcomes  Possible risks and side effects  This patient would benefit from PT intervention to resume normal activities.   Rehab potential is good.    Frequency:  1 X week, once daily  Duration:  for 6 weeks  Discharge Plan:  Achieve all LTG.  Independent in home treatment program.  Reach maximal therapeutic benefit.    Please refer to the daily flowsheet for treatment today, total treatment time and time spent performing 1:1 timed codes.                           Objective:  System    Physical Exam    General     ROS

## 2019-09-27 ENCOUNTER — OFFICE VISIT (OUTPATIENT)
Dept: FAMILY MEDICINE | Facility: CLINIC | Age: 19
End: 2019-09-27
Payer: COMMERCIAL

## 2019-09-27 VITALS
TEMPERATURE: 99.3 F | SYSTOLIC BLOOD PRESSURE: 134 MMHG | DIASTOLIC BLOOD PRESSURE: 84 MMHG | HEART RATE: 111 BPM | WEIGHT: 149 LBS | RESPIRATION RATE: 18 BRPM | BODY MASS INDEX: 25.58 KG/M2 | OXYGEN SATURATION: 98 %

## 2019-09-27 DIAGNOSIS — M26.609 TMJ (TEMPOROMANDIBULAR JOINT SYNDROME): Primary | ICD-10-CM

## 2019-09-27 PROCEDURE — 99213 OFFICE O/P EST LOW 20 MIN: CPT | Performed by: PHYSICIAN ASSISTANT

## 2019-09-27 NOTE — PROGRESS NOTES
Subjective     Alfreda Hernandez is a 19 year old female who presents to clinic today for the following health issues:    HPI   Ear Pain      Duration: 2 days    Description  Left ear pain     Severity: moderate    Accompanying signs and symptoms: None    History (predisposing factors):  none    Precipitating or alleviating factors: None    Therapies tried and outcome:  ibuprofen     Was seen in .C 8/24/19. See note for details.  She feels like the majority of her pain got better but continues to have left ear fullness.  She denies any discharge.  Denies any colds or coughs or fevers recently.  She does have some pain with chewing.  She is not a gum chewer      Patient Active Problem List   Diagnosis     Vitamin D deficiency     Moderate recurrent major depression (H)     Anxiety     Panic attack     Contraception     Panic disorder without agoraphobia     Episodic mood disorder (H)     Bipolar 2 disorder (H)     PTSD (post-traumatic stress disorder)     Myofascial pain     Temporomandibular joint disorder     Tension-type headache     Xerostomia     Elevated blood-pressure reading without diagnosis of hypertension     Chronic bilateral low back pain with bilateral sciatica     Past Surgical History:   Procedure Laterality Date     PE TUBES       TONSILLECTOMY, ADENOIDECTOMY, COMBINED Bilateral 12/26/2016    Procedure: COMBINED TONSILLECTOMY, ADENOIDECTOMY;  Surgeon: Olvin Raymond MD;  Location:  OR       Social History     Tobacco Use     Smoking status: Never Smoker     Smokeless tobacco: Never Used     Tobacco comment: non smoking household   Substance Use Topics     Alcohol use: No     Alcohol/week: 0.0 standard drinks     Family History   Problem Relation Age of Onset     Asthma Mother      Thyroid Disease Mother         resolved shortly after pregnancy     Other - See Comments Mother         Dx with Lupus on 11/2014     Hypertension Father      Diabetes Maternal Grandfather      Heart Disease Other          maternal great grandparents     Breast Cancer Other         maternal great aunt     Cerebrovascular Disease No family hx of      Glaucoma No family hx of      Macular Degeneration No family hx of          Current Outpatient Medications   Medication Sig Dispense Refill     Amphetamine-Dextroamphetamine (ADDERALL PO) Take 20 mg by mouth 2 times daily       DULoxetine (CYMBALTA) 30 MG capsule TAKE 1 CAPSULE BY MOUTH ONCE DAILY IN ADDITION TO THE 60MG CAPSULE AT BEDTIME FOR A TOTAL DOSE OF 90MG DAILY.  2     DULoxetine (CYMBALTA) 60 MG capsule TAKE 1 CAPSULE BY MOUTH ONCE DAILY  3     ketorolac (TORADOL) 10 MG tablet Take 1 tablet (10 mg) by mouth every 6 hours as needed for moderate pain 12 tablet 1     LamoTRIgine (LAMICTAL) 250 MG ER tablet Take 250 mg by mouth At Bedtime       propranolol (INDERAL) 40 MG tablet Take 1 tablet (40 mg) by mouth 2 times daily 60 tablet 1     Allergies   Allergen Reactions     Nkda [No Known Drug Allergies]        Reviewed and updated as needed this visit by Provider  Tobacco  Allergies  Meds  Problems  Med Hx  Surg Hx  Fam Hx         Review of Systems   ROS COMP: Constitutional, HEENT, cardiovascular, pulmonary, gi and gu systems are negative, except as otherwise noted.      Objective    /84   Pulse 111   Temp 99.3  F (37.4  C) (Oral)   Resp 18   Wt 67.6 kg (149 lb)   SpO2 98%   BMI 25.58 kg/m    Body mass index is 25.58 kg/m .  Physical Exam   GENERAL: healthy, alert and no distress  Head: Normocephalic, atraumatic.  Eyes: Conjunctiva clear, non icteric. PERRLA.  Ears: External ears and TMs normal BL.  Nose: Septum midline, nasal mucosa pink and moist. No discharge.  Mouth / Throat: Normal dentition.  No oral lesions. Pharynx non erythematous, tonsils without hypertrophy. Left TMJ tenderness no clicking  Neck: Supple, no enlarged LN, trachea midline.  Heart: S1 and S2 normal, no murmurs, clicks, gallops or rubs. Regular rate and rhythm.  Chest: Clear; no wheezes or  annetta.    Diagnostic Test Results:  none         Assessment & Plan       ICD-10-CM    1. TMJ (temporomandibular joint syndrome) M26.609    Symptoms may be consistent with TMJ syndrome.  We discussed avoiding chewy foods and hard can use it.  Okay for use of anti-inflammatories and/or Tylenol ice and/or heat.  Recheck in 2 weeks as needed     Return in about 2 weeks (around 10/11/2019) for recheck, As Needed.    Jitendra Moran PA-C  [facility]

## 2019-11-05 ENCOUNTER — HEALTH MAINTENANCE LETTER (OUTPATIENT)
Age: 19
End: 2019-11-05

## 2019-12-05 NOTE — PROGRESS NOTES
Patient seen for one time evaluation and treatment.  Patient did not return for further treatment so current status is unknown.  Please see initial evaluation for further information.

## 2019-12-09 ENCOUNTER — ALLIED HEALTH/NURSE VISIT (OUTPATIENT)
Dept: NURSING | Facility: CLINIC | Age: 19
End: 2019-12-09
Payer: COMMERCIAL

## 2019-12-09 DIAGNOSIS — Z30.42 ENCOUNTER FOR SURVEILLANCE OF INJECTABLE CONTRACEPTIVE: ICD-10-CM

## 2019-12-09 DIAGNOSIS — Z30.9 CONTRACEPTIVE MANAGEMENT: Primary | ICD-10-CM

## 2019-12-09 LAB — HCG UR QL: NEGATIVE

## 2019-12-09 PROCEDURE — 81025 URINE PREGNANCY TEST: CPT | Performed by: NURSE PRACTITIONER

## 2019-12-09 PROCEDURE — 96372 THER/PROPH/DIAG INJ SC/IM: CPT

## 2019-12-09 RX ORDER — MEDROXYPROGESTERONE ACETATE 150 MG/ML
150 INJECTION, SUSPENSION INTRAMUSCULAR
Status: DISCONTINUED | OUTPATIENT
Start: 2019-12-09 | End: 2022-04-26

## 2019-12-09 RX ADMIN — MEDROXYPROGESTERONE ACETATE 150 MG: 150 INJECTION, SUSPENSION INTRAMUSCULAR at 10:17

## 2019-12-09 NOTE — PROGRESS NOTES
Patient here for depo late will get UPT and needs an order to give if UPT negative.    Emma Swift, APRN, CNP

## 2019-12-09 NOTE — PROGRESS NOTES
Clinic Administered Medication Documentation    MEDICATION LIST:   Depo Provera Documentation    Prior to injection, verified patient identity using patient's name and date of birth. Medication was administered. Please see MAR and medication order for additional information. Patient instructed to remain in clinic for 15 minutes and report any adverse reaction to staff immediately .    BP: Data Unavailable    LAST PAP/EXAM: No results found for: PAP  URINE HCG:negative    NEXT INJECTION DUE: 2/24/20 - 3/9/20    Was entire vial of medication used? Yes  Vial/Syringe: Single dose vial  Expiration Date:  03/2021  Lot # [id number]  NDC# [id number]  Josiane Nichols LPN

## 2020-01-26 DIAGNOSIS — G43.809 OTHER MIGRAINE WITHOUT STATUS MIGRAINOSUS, NOT INTRACTABLE: ICD-10-CM

## 2020-01-26 NOTE — LETTER
January 27, 2020    Alfreda Hernandez  [address]  [place] [address]    Dear Alfreda,       We recently received a refill request for propranolol (INDERAL) 40 MG tablet.  We have refilled this for a one time 30 day supply only because you are due for a:    office visit      Please call at your earliest convenience so that there will not be a delay with your future refills.          Thank you,   Your [facility] Team/na    [phone]

## 2020-01-27 ENCOUNTER — MYC REFILL (OUTPATIENT)
Dept: PEDIATRICS | Facility: CLINIC | Age: 20
End: 2020-01-27

## 2020-01-27 DIAGNOSIS — G43.809 OTHER MIGRAINE WITHOUT STATUS MIGRAINOSUS, NOT INTRACTABLE: ICD-10-CM

## 2020-01-27 RX ORDER — PROPRANOLOL HYDROCHLORIDE 40 MG/1
TABLET ORAL
Qty: 30 TABLET | Refills: 0 | Status: SHIPPED | OUTPATIENT
Start: 2020-01-27 | End: 2020-02-19

## 2020-01-27 NOTE — TELEPHONE ENCOUNTER
Prescription [place] per [facility] [place]  APPT NEEDED FOR FURTHER REFILLS  Please help the pt schedule an appointment headaches.  Health Maintenance Due   Topic Date Due     HIV SCREENING  09/13/2014     EYE EXAM  01/11/2017     PREVENTIVE CARE VISIT  07/05/2018     DEPO  01/24/2019     Kavitha Coyne RN

## 2020-01-28 RX ORDER — PROPRANOLOL HYDROCHLORIDE 40 MG/1
40 TABLET ORAL 2 TIMES DAILY
Qty: 30 TABLET | Refills: 0 | OUTPATIENT
Start: 2020-01-28

## 2020-02-19 ENCOUNTER — OFFICE VISIT (OUTPATIENT)
Dept: PEDIATRICS | Facility: CLINIC | Age: 20
End: 2020-02-19
Payer: COMMERCIAL

## 2020-02-19 VITALS
HEIGHT: 65 IN | SYSTOLIC BLOOD PRESSURE: 124 MMHG | TEMPERATURE: 96 F | RESPIRATION RATE: 18 BRPM | OXYGEN SATURATION: 98 % | WEIGHT: 144 LBS | HEART RATE: 71 BPM | DIASTOLIC BLOOD PRESSURE: 81 MMHG | BODY MASS INDEX: 23.99 KG/M2

## 2020-02-19 DIAGNOSIS — G43.809 OTHER MIGRAINE WITHOUT STATUS MIGRAINOSUS, NOT INTRACTABLE: Primary | ICD-10-CM

## 2020-02-19 DIAGNOSIS — Z78.9 USES BIRTH CONTROL: ICD-10-CM

## 2020-02-19 PROCEDURE — 96372 THER/PROPH/DIAG INJ SC/IM: CPT | Performed by: NURSE PRACTITIONER

## 2020-02-19 PROCEDURE — 99213 OFFICE O/P EST LOW 20 MIN: CPT | Mod: 25 | Performed by: NURSE PRACTITIONER

## 2020-02-19 RX ORDER — PROPRANOLOL HYDROCHLORIDE 40 MG/1
40 TABLET ORAL 2 TIMES DAILY
Qty: 60 TABLET | Refills: 2 | Status: SHIPPED | OUTPATIENT
Start: 2020-02-19 | End: 2020-06-26

## 2020-02-19 RX ORDER — KETOROLAC TROMETHAMINE 10 MG/1
10 TABLET, FILM COATED ORAL EVERY 6 HOURS PRN
Qty: 12 TABLET | Refills: 1 | Status: SHIPPED | OUTPATIENT
Start: 2020-02-19 | End: 2022-04-26

## 2020-02-19 RX ADMIN — MEDROXYPROGESTERONE ACETATE 150 MG: 150 INJECTION, SUSPENSION INTRAMUSCULAR at 12:50

## 2020-02-19 ASSESSMENT — MIFFLIN-ST. JEOR: SCORE: 1416.12

## 2020-02-19 NOTE — PROGRESS NOTES
Subjective    Alfreda Hernandez is a 19 year old female who presents to clinic today with self because of:  Contraception and Recheck Medication     HPI   Concerns: recheck Propranolol and depo shot      She is here to get her Depo if she can but needs to check on the dates.  Also she needs a refill on her propranolol she has not noticed a difference in her headaches because she still gets headaches a lot but her BP has been lower now on a regular basis.  Her psychiatrist has even commented on this too that her BP's are lower.    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           Review of Systems  GENERAL:  NEGATIVE for fever, poor appetite, and sleep disruption.  SKIN:  NEGATIVE for rash, hives, and eczema.  EYE:  NEGATIVE for pain, discharge, redness, itching and vision problems.  ENT:  NEGATIVE for ear pain, runny nose, congestion and sore throat.  RESP:  NEGATIVE for cough, wheezing, and difficulty breathing.  CARDIAC:  NEGATIVE for chest pain and cyanosis.   GI:  NEGATIVE for vomiting, diarrhea, abdominal pain and constipation.  :  NEGATIVE for urinary problems.  NEURO:  As in HPI  ALLERGY:  As in Allergy History  MSK:  NEGATIVE for muscle problems and joint problems.    Problem List  Patient Active Problem List    Diagnosis Date Noted     Elevated blood-pressure reading without diagnosis of hypertension 09/04/2019     Priority: Medium     Chronic bilateral low back pain with bilateral sciatica 09/04/2019     Priority: Medium     Myofascial pain 02/09/2018     Priority: Medium     Temporomandibular joint disorder 02/09/2018     Priority: Medium     Tension-type headache 02/09/2018     Priority: Medium     Xerostomia 02/09/2018     Priority: Medium     Bipolar 2 disorder (H) 01/20/2016     Priority: Medium     PTSD (post-traumatic stress disorder) 01/20/2016     Priority: Medium     Episodic mood disorder (H) 09/28/2015     Priority: Medium     9/15: Being  "seen by psychiatry at [facility].  On Abilify and something else for her anxiety (she cannot remember which medication.)       Panic disorder without agoraphobia 05/07/2015     Priority: Medium     Contraception 03/09/2015     Priority: Medium     Moderate recurrent major depression (H) 10/22/2014     Priority: Medium     Anxiety 10/22/2014     Priority: Medium     Panic attack 10/22/2014     Priority: Medium     Vitamin D deficiency 10/10/2014     Priority: Medium      Medications  Amphetamine-Dextroamphetamine (ADDERALL PO), Take 20 mg by mouth 2 times daily  DULoxetine (CYMBALTA) 30 MG capsule, TAKE 1 CAPSULE BY MOUTH ONCE DAILY IN ADDITION TO THE 60MG CAPSULE AT BEDTIME FOR A TOTAL DOSE OF 90MG DAILY.  DULoxetine (CYMBALTA) 60 MG capsule, TAKE 1 CAPSULE BY MOUTH ONCE DAILY  ketorolac (TORADOL) 10 MG tablet, Take 1 tablet (10 mg) by mouth every 6 hours as needed for moderate pain  LamoTRIgine (LAMICTAL) 250 MG ER tablet, Take 250 mg by mouth At Bedtime  propranolol (INDERAL) 40 MG tablet, TAKE 1 TABLET BY MOUTH TWICE DAILY    medroxyPROGESTERone (DEPO-PROVERA) injection 150 mg      Allergies  Allergies   Allergen Reactions     Nkda [No Known Drug Allergies]      Reviewed and updated as needed this visit by Provider           Objective    /81   Pulse 71   Temp 96  F (35.6  C) (Oral)   Resp 18   Ht 5' 4.5\" (1.638 m)   Wt 144 lb (65.3 kg)   SpO2 98%   BMI 24.34 kg/m    Normalized weight-for-age data not available for patients older than 20 years.    Physical Exam  GENERAL: Active, alert, in no acute distress.  SKIN: Clear. No significant rash, abnormal pigmentation or lesions  HEAD: Normocephalic.  EYES:  No discharge or erythema. Normal pupils and EOM.  EARS: Normal canals. Tympanic membranes are normal; gray and translucent.  NOSE: Normal without discharge.  MOUTH/THROAT: Clear. No oral lesions. Teeth intact without obvious abnormalities.  NECK: Supple, no masses.  LYMPH NODES: No " adenopathy  LUNGS: Clear. No rales, rhonchi, wheezing or retractions  HEART: Regular rhythm. Normal S1/S2. No murmurs.    Diagnostics: None      Assessment & Plan    1. Other migraine without status migrainosus, not intractable  refilled and encouraged her to see Neurology .  - propranolol 40 MG PO tablet; Take 1 tablet (40 mg) by mouth 2 times daily  Dispense: 60 tablet; Refill: 2  - ketorolac 10 MG PO tablet; Take 1 tablet (10 mg) by mouth every 6 hours as needed for moderate pain  Dispense: 12 tablet; Refill: 1    2. Contraception  OK to give Depo 5 days early.      Follow Up  No follow-ups on file.  If not improving or if worsening  in 3 month(s)    Emma Swift, PNP, APRN CNP

## 2020-02-19 NOTE — NURSING NOTE
Clinic Administered Medication Documentation    MEDICATION LIST:   Depo Provera Documentation  Prior to injection, verified patient identity using patient's name and date of birth. Medication was administered. Please see MAR and medication order for additional information. Patient instructed to remain in clinic for 15 minutes.    BP: 124/81    LAST PAP/EXAM: No results found for: PAP  URINE HCG:not indicated    NEXT INJECTION DUE: 5/6/20 - 5/20/20    Was entire vial of medication used? Yes  Vial/Syringe: Single dose vial  Expiration Date:  10/01/20  Bailey King CMA

## 2020-02-20 RX ORDER — QUETIAPINE FUMARATE 25 MG/1
TABLET, FILM COATED ORAL
COMMUNITY
Start: 2020-02-11 | End: 2020-09-08

## 2020-06-08 ENCOUNTER — OFFICE VISIT (OUTPATIENT)
Dept: PEDIATRICS | Facility: CLINIC | Age: 20
End: 2020-06-08
Payer: COMMERCIAL

## 2020-06-08 VITALS
OXYGEN SATURATION: 100 % | DIASTOLIC BLOOD PRESSURE: 82 MMHG | WEIGHT: 155.25 LBS | TEMPERATURE: 98.1 F | HEART RATE: 99 BPM | BODY MASS INDEX: 26.24 KG/M2 | SYSTOLIC BLOOD PRESSURE: 120 MMHG

## 2020-06-08 DIAGNOSIS — M54.41 ACUTE BILATERAL LOW BACK PAIN WITH RIGHT-SIDED SCIATICA: ICD-10-CM

## 2020-06-08 DIAGNOSIS — R23.3 PETECHIAE: ICD-10-CM

## 2020-06-08 DIAGNOSIS — L30.9 DERMATITIS: ICD-10-CM

## 2020-06-08 DIAGNOSIS — L85.8 KERATOSIS PILARIS: ICD-10-CM

## 2020-06-08 DIAGNOSIS — Z30.09 BIRTH CONTROL COUNSELING: ICD-10-CM

## 2020-06-08 DIAGNOSIS — L56.8 PHOTODERMATITIS DUE TO SUN: Primary | ICD-10-CM

## 2020-06-08 LAB
APTT PPP: 28 SEC (ref 22–37)
BASOPHILS # BLD AUTO: 0.1 10E9/L (ref 0–0.2)
BASOPHILS NFR BLD AUTO: 0.7 %
DIFFERENTIAL METHOD BLD: NORMAL
EOSINOPHIL # BLD AUTO: 0.1 10E9/L (ref 0–0.7)
EOSINOPHIL NFR BLD AUTO: 1 %
ERYTHROCYTE [DISTWIDTH] IN BLOOD BY AUTOMATED COUNT: 11.9 % (ref 10–15)
HCG UR QL: NEGATIVE
HCT VFR BLD AUTO: 45.2 % (ref 35–47)
HGB BLD-MCNC: 15.1 G/DL (ref 11.7–15.7)
INR PPP: 0.97 (ref 0.86–1.14)
LYMPHOCYTES # BLD AUTO: 2.7 10E9/L (ref 0.8–5.3)
LYMPHOCYTES NFR BLD AUTO: 33.4 %
MCH RBC QN AUTO: 30.6 PG (ref 26.5–33)
MCHC RBC AUTO-ENTMCNC: 33.4 G/DL (ref 31.5–36.5)
MCV RBC AUTO: 92 FL (ref 78–100)
MONOCYTES # BLD AUTO: 0.6 10E9/L (ref 0–1.3)
MONOCYTES NFR BLD AUTO: 7.2 %
NEUTROPHILS # BLD AUTO: 4.7 10E9/L (ref 1.6–8.3)
NEUTROPHILS NFR BLD AUTO: 57.7 %
PLATELET # BLD AUTO: 303 10E9/L (ref 150–450)
RBC # BLD AUTO: 4.94 10E12/L (ref 3.8–5.2)
WBC # BLD AUTO: 8.1 10E9/L (ref 4–11)

## 2020-06-08 PROCEDURE — 81025 URINE PREGNANCY TEST: CPT | Performed by: NURSE PRACTITIONER

## 2020-06-08 PROCEDURE — 85025 COMPLETE CBC W/AUTO DIFF WBC: CPT | Performed by: NURSE PRACTITIONER

## 2020-06-08 PROCEDURE — 85610 PROTHROMBIN TIME: CPT | Performed by: NURSE PRACTITIONER

## 2020-06-08 PROCEDURE — 85730 THROMBOPLASTIN TIME PARTIAL: CPT | Performed by: NURSE PRACTITIONER

## 2020-06-08 PROCEDURE — 36415 COLL VENOUS BLD VENIPUNCTURE: CPT | Performed by: NURSE PRACTITIONER

## 2020-06-08 PROCEDURE — 99214 OFFICE O/P EST MOD 30 MIN: CPT | Mod: 25 | Performed by: NURSE PRACTITIONER

## 2020-06-08 PROCEDURE — 96372 THER/PROPH/DIAG INJ SC/IM: CPT | Performed by: NURSE PRACTITIONER

## 2020-06-08 RX ORDER — MEDROXYPROGESTERONE ACETATE 150 MG/ML
150 INJECTION, SUSPENSION INTRAMUSCULAR
Status: DISCONTINUED | OUTPATIENT
Start: 2020-06-08 | End: 2022-04-26

## 2020-06-08 RX ORDER — PREDNISONE 10 MG/1
TABLET ORAL
Qty: 20 TABLET | Refills: 0 | Status: SHIPPED | OUTPATIENT
Start: 2020-06-08 | End: 2020-09-08

## 2020-06-08 RX ADMIN — MEDROXYPROGESTERONE ACETATE 150 MG: 150 INJECTION, SUSPENSION INTRAMUSCULAR at 17:11

## 2020-06-08 NOTE — PATIENT INSTRUCTIONS
"Keratosis Pilaris    Keratosis Pilaris (KP) is a common skin condition that is not harmful.  It tends to run in families and usually affects the upper arms, and sometimes affects the cheeks and thighs.  Facial involvement tends to improve with age (after childhood).  There is no cure for keratosis pilaris, but certain moisturizers (see below) may make the bumps smoother and less obvious.  If the KP is itchy or inflamed, your doctor may prescribe a medication to improve these symptoms    Recommended moisturizers:     Ammonium lactate cream or lotion, 4% or 8% (brand names include AmLactin and LacHydrin)  CeraVe SA lotion  Eucerin \"Smoothing Repair\" Or \"Professional Repair\" lotion    Sometimes these are kept behind the pharmacy counter or need to be ordered by the pharmacist.  They are also available for purchase on the internet.    Gentle Skin Care  For Babies and Children  Gentle skin care starts with good bathing and keeping the skin moist. Gentle skin care helps babies and children with sensitive skin and eczema. It also helps with long-lasting (chronic) dry skin.  Skin care products  Here are some gentle skin care products you may want to try.* You can try other brands too. Generic and store brands are OK as well. Just make sure everything is fragrance free.  Mild cleansers (instead of soap):    Aquaphor 2 in1 Gentle Wash and Shampoo    CeraVe    Cetaphil Gentle Cleanser (Stay away from Cetaphil's \"baby\" line because it has fragrance.)    Dove Fragrance Free Bar    Vanicream Cleansing Bar  Shampoos and conditioners:    Aquaphor 2 in 1 Gentle Wash and Shampoo    [place] Baby \"Super Sensitive\" Shampoo    Free and Clear by Vanicream  Moisturizers:    Creams: Cetaphil cream, CeraVe cream, Eucerin cream, and Vanicream    Ointments: Aquaphor Ointment, Vaseline, petroleum jelly, and Vaniply  Don't use lotion: It's too thin for eczema. It can also have alcohol, which irritates the skin. Ointments and creams work " "better.  Oils:    Mineral oil    Coconut and sunflower seed oil work for some children.  Sunblock:     Use sunscreens that have zinc oxide or titanium dioxide. These block the sun.    Make sure the sunblock has SPF 30 or more.    Don't use spray cans (aerosols) or \"chemical\" sunscreens if you can avoid them.  Laundry products:    All Free and Clear    Cheer Free    Dreft    Tide Free    Generic Brands are OK as long as they are \"Fragrance Free.\"    Don't use fabric softeners or dryer sheets.  Stay away from these products    Don't use products that have added fragrance.    \"Organic\" does not mean \"fragrance free.\" In fact, some organic products have plant parts that can irritate sensitive skin.    Many \"baby\" products have added fragrance that may bother your child's skin.  Skin care tips  1. Daily bathing in a tub bath is best to soak the skin and get clean.  2. Use lukewarm water.  3. Keep bathing and showering short--less than 15 minutes.  4. When you wash, focus on the skin folds, face and feet.  5. After bathing, pat the skin lightly with a towel. Don't rub or scrub when drying.  6. Put on moisturizer right away after the bath.  7. If the doctor prescribed medicine to put on the skin, put the medicine on first. Then put on the moisturizer.  8. Use moisturizing creams at least 2 times a day on the whole body. For example, in the morning and before bed. Your provider may suggest using a lighter or heavier cream based on your child's skin and the time of year.  \"Do's\" and \"Don'ts\"  Do    Bathe in a tub rather than shower whenever you can.    Wash new clothes before your child wears them for the first time.    Put on moisturizing creams or ointments at least twice daily to the whole body.  Don't    Don't use bubble bath.    Don't scrub hard when cleaning the skin.    Don't use skin lotion instead of cream. Lotions don't work as well.    Don't use products like powders, perfumes or colognes.    Don't dress your " "child in \"scratchy\" clothes, like wool.  *We don't endorse any specific product or brand. The products listed here are just examples.  Prepared by the [facility] of Pediatric [facility]. For informational purposes only. Not to replace the advice of your health care provider. Copyright   2017 [facility]. All rights reserved. [employer] 671250 - 4/17.          "

## 2020-06-08 NOTE — PROGRESS NOTES
"Subjective    Alfreda Hernandez is a 19 year old female who presents to clinic today because of:  Contraception and Allergic Reaction (Sun)     HPI   Concerns: Depo shot- Due on 6/6/2020 and needs referral for Sun allergy     allergic reaction on face and arms to the sun.  \"I have had this forever started happening when I was really little.  This year I started to get a rash on my face when it started to get warm.  My whole like I have dealt with this but I cannot wear sunscreen.  My grandma on my dad's side, she is full native, has it too.\"  It itches a lot.  I have these bumps on my arms and I am using unscented products.  They got really bad this winter.  Also did get an allergic reaction to my shampoo and was getting blisters and then changed to fragrance free.        Concerns: numbness of right lower leg  Has had numbness in right lower leg for the past month.   Pain starts in her lower back and ten down thigh and then down her right leg and then numb in RLL..    Due for Depo today.          Review of Systems  GENERAL:  NEGATIVE for fever, poor appetite, and sleep disruption.  SKIN:  As in HPI  EYE:  NEGATIVE for pain, discharge, redness, itching and vision problems.  ENT:  NEGATIVE for ear pain, runny nose, congestion and sore throat.  RESP:  NEGATIVE for cough, wheezing, and difficulty breathing.  CARDIAC:  NEGATIVE for chest pain and cyanosis.   GI:  NEGATIVE for vomiting, diarrhea, abdominal pain and constipation.  :  NEGATIVE for urinary problems.  NEURO:  As in HPI  ALLERGY:  As in HPI  MSK:  As in HPI    Problem List  Patient Active Problem List    Diagnosis Date Noted     Elevated blood-pressure reading without diagnosis of hypertension 09/04/2019     Priority: Medium     Chronic bilateral low back pain with bilateral sciatica 09/04/2019     Priority: Medium     Myofascial pain 02/09/2018     Priority: Medium     Temporomandibular joint disorder 02/09/2018     Priority: Medium     Tension-type headache " 02/09/2018     Priority: Medium     Xerostomia 02/09/2018     Priority: Medium     Bipolar 2 disorder (H) 01/20/2016     Priority: Medium     PTSD (post-traumatic stress disorder) 01/20/2016     Priority: Medium     Episodic mood disorder (H) 09/28/2015     Priority: Medium     9/15: Being [facility] by [facility]ry at [facility].  On Abilify and something else for her anxiety (she cannot remember which medication.)       Panic disorder without agoraphobia 05/07/2015     Priority: Medium     Contraception 03/09/2015     Priority: Medium     Moderate recurrent major depression (H) 10/22/2014     Priority: Medium     Anxiety 10/22/2014     Priority: Medium     Panic attack 10/22/2014     Priority: Medium     Vitamin D deficiency 10/10/2014     Priority: Medium      Medications  DULoxetine (CYMBALTA) 30 MG capsule, TAKE 1 CAPSULE BY MOUTH ONCE DAILY IN ADDITION TO THE 60MG CAPSULE AT BEDTIME FOR A TOTAL DOSE OF 90MG DAILY.  LamoTRIgine (LAMICTAL) 250 MG ER tablet, Take 250 mg by mouth At Bedtime  propranolol 40 MG PO tablet, Take 1 tablet (40 mg) by mouth 2 times daily  QUEtiapine 25 MG PO tablet,   DULoxetine (CYMBALTA) 60 MG capsule, TAKE 1 CAPSULE BY MOUTH ONCE DAILY  ketorolac 10 MG PO tablet, Take 1 tablet (10 mg) by mouth every 6 hours as needed for moderate pain (Patient not taking: Reported on 6/8/2020)    medroxyPROGESTERone (DEPO-PROVERA) injection 150 mg      Allergies  Allergies   Allergen Reactions     Nkda [No Known Drug Allergies]      Reviewed and updated as needed this visit by Provider           Objective    /82   Pulse 99   Temp 98.1  F (36.7  C) (Tympanic)   Wt 155 lb 4 oz (70.4 kg)   SpO2 100%   BMI 26.24 kg/m    Normalized weight-for-age data not available for patients older than 20 years.    Physical Exam  GENERAL: Active, alert, in no acute distress.  SKIN: erythematous cheeks with a raised feeling although no definitive papules, fine petechiae on forearms, flesh colored papules  on inner forearms  HEAD: Normocephalic.  EYES:  No discharge or erythema. Normal pupils and EOM.  EARS: Normal canals. Tympanic membranes are normal; gray and translucent.  NOSE: Normal without discharge.  MOUTH/THROAT: Clear. No oral lesions. Teeth intact without obvious abnormalities.  NECK: Supple, no masses.  LYMPH NODES: No adenopathy  LUNGS: Clear. No rales, rhonchi, wheezing or retractions  HEART: Regular rhythm. Normal S1/S2. No murmurs.  BACK:  Straight, pain with forward bend, backward bend and side to side pain with palpation of lower back above hips    Diagnostics:   Results for orders placed or performed in visit on 06/08/20 (from the past 24 hour(s))   CBC with platelets and differential   Result Value Ref Range    WBC 8.1 4.0 - 11.0 10e9/L    RBC Count 4.94 3.8 - 5.2 10e12/L    Hemoglobin 15.1 11.7 - 15.7 g/dL    Hematocrit 45.2 35.0 - 47.0 %    MCV 92 78 - 100 fl    MCH 30.6 26.5 - 33.0 pg    MCHC 33.4 31.5 - 36.5 g/dL    RDW 11.9 10.0 - 15.0 %    Platelet Count 303 150 - 450 10e9/L    % Neutrophils 57.7 %    % Lymphocytes 33.4 %    % Monocytes 7.2 %    % Eosinophils 1.0 %    % Basophils 0.7 %    Absolute Neutrophil 4.7 1.6 - 8.3 10e9/L    Absolute Lymphocytes 2.7 0.8 - 5.3 10e9/L    Absolute Monocytes 0.6 0.0 - 1.3 10e9/L    Absolute Eosinophils 0.1 0.0 - 0.7 10e9/L    Absolute Basophils 0.1 0.0 - 0.2 10e9/L    Diff Method Automated Method    HCG Qual, Urine ([id number])   Result Value Ref Range    HCG Qual Urine Negative NEG^Negative         Assessment & Plan    1. Photodermatitis due to sun  2. Dermatitis    Will treat with a burst of steroids and will have hr see derm for this as well as the dermatitis and sensitivity with shampoo products for her scalp.  - DERMATOLOGY REFERRAL  - predniSONE (DELTASONE) 10 MG tablet; Take 3 tabs by mouth daily x 3 days, then 2 tabs daily x 3 days, then 1 tab daily x 3 days, then 1/2 tab daily x 3 days.  Dispense: 20 tablet; Refill: 0    3. Keratosis  pilaris  discussed and handout given.    4. Acute bilateral low back pain with right-sided sciatica  Will have hr see ortho as hx of low back pain and now with sciatica and numbness in lower right leg.  Activity as tolerated.    - Orthopedic & Spine  Referral; Future    5. Petechiae  Labs today  - CBC with platelets and differential  - INR  - Partial thromboplastin time    6. Birth control counseling  Negative pregnancy test will proceed with depo.  - HCG Qual, Urine ([id number])  - medroxyPROGESTERone (DEPO-PROVERA) injection 150 mg    Follow Up  No follow-ups on file.  If not improving or if worsening  in 3 month(s) for DEPO    Face to Face time greater than 25 min with greater than 50 % in counseling on photodermatitis, petechiae, keratosis pilaris and back pain and treatment options.      Emma Swift, PNP, APRN CNP

## 2020-06-11 ENCOUNTER — MYC MEDICAL ADVICE (OUTPATIENT)
Dept: DERMATOLOGY | Facility: CLINIC | Age: 20
End: 2020-06-11

## 2020-06-15 ENCOUNTER — VIRTUAL VISIT (OUTPATIENT)
Dept: DERMATOLOGY | Facility: CLINIC | Age: 20
End: 2020-06-15
Payer: COMMERCIAL

## 2020-06-15 DIAGNOSIS — L56.4 POLYMORPHOUS LIGHT ERUPTION: Primary | ICD-10-CM

## 2020-06-15 PROCEDURE — 99202 OFFICE O/P NEW SF 15 MIN: CPT | Mod: 95 | Performed by: DERMATOLOGY

## 2020-06-15 RX ORDER — HYDROCORTISONE VALERATE 2 MG/G
OINTMENT TOPICAL
Qty: 60 G | Refills: 11 | Status: SHIPPED | OUTPATIENT
Start: 2020-06-15 | End: 2022-04-26

## 2020-06-15 ASSESSMENT — PAIN SCALES - GENERAL: PAINLEVEL: NO PAIN (0)

## 2020-06-15 NOTE — LETTER
6/15/2020         RE: Alfreda Hernandez  [address]  [address]  [place] [address]        Dear Colleague,    Thank you for referring your patient, Alfreda Hernandez, to the [facility]. Please see a copy of my visit note below.    [facility]  Video: ( Invitation sent by:  George and send to e-mail at: [email or web address] )      Impression and Recommendations (Patient Counseled on the Following):    1. 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 Less likely consider solar urticaria given edematous appearance of recent flare. We discussed likely etiology and treatment options including photoprotection, PRN use of topical or systemic steroids for flares, as well as preventative therapies including nbUVB therapy and plaquenil. Will continue PRN use of topical steroids and strict photoprotection, but would be interested in pursuing nbUVB treatment next late winter/spring to prevent flare.     - Start westcort 0.2% ointment BID PRN flares  - Strict photoprotection; recommended using a zinc-oxide containing sunscreen such as Vanicream sunscreen.   - Will arrange follow-up for next January or February and consider starting nbUVB     Follow-up:   Follow-up with dermatology in January or February 2020. Earlier for new or changing lesions or rash.      Staff only:    Paul Garner MD  Pronouns: he/him/his    Department of Dermatology  [facility]: Phone: [phone], Fax:[phone]  [facility]: Phone: [phone] Fax: [phone]  _____________________________________________________________________________    Dermatology Problem List:  1. PMLE  - westcort 0.2% ointment BID PRN  - strict photoprotection  - consider nbUVB phototherapy next spring    Encounter Date: Jun  15, 2020    CC:   Chief Complaint   Patient presents with     Derm Problem     History of Present Illness:  I have reviewed the teledermatology information and the nursing intake corresponding to this issue. Alfreda Hernandez is a 19 year old female who presents via teledermatology for photosensitive dermatitis on her face and extremities.     Patient reports a many-year history of an itchy skin eruption on her face, neck, chest, arms, and legs. She states this happens during the late spring/ early summer every year for about 3-4 weeks. After sun exposure, about 4-6 hours will develop very itchy rash on sun-exposed areas. She has tried using over-the-counter chemical sunscreens but these have never been helpful. She is  and has a grandmother who had similar issues. Had a recent outbreak in the last 1-2 weeks ago and was started on a course of oral prednisone her primary care provider on 6/8/2020 which she is just completing. She states this was very helpful. She has a history of bipolar depression, but otherwise no other health issues. Denies any other new topical exposures. Health otherwise stable. No other skin concerns.     ROS: Patient is generally feeling well today.     Physical Examination:  General: Well-appearing female, appropriately-developed individual.  Skin: Focused examination within the teledermatology photograph(s) including face, chest, upper and lower extremities was performed.   - Scattered brown macules on malar cheeks bilaterally c/w solar lentigenes  - Few brown macules on legs and arms c/w PIH  - Photos from recent flare also reviewed including pink edematous plaques on face, well demarcated pink plaques on legs and arms.     Labs:  None.     Past Medical History:   Patient Active Problem List   Diagnosis     Vitamin D deficiency     Moderate recurrent major depression (H)     Anxiety     Panic attack     Contraception     Panic disorder without agoraphobia     Episodic mood  disorder (H)     Bipolar 2 disorder (H)     PTSD (post-traumatic stress disorder)     Myofascial pain     Temporomandibular joint disorder     Tension-type headache     Xerostomia     Elevated blood-pressure reading without diagnosis of hypertension     Chronic bilateral low back pain with bilateral sciatica     Past Medical History:   Diagnosis Date     Acanthosis nigricans      Amblyopia      Bipolar 2 disorder (H) 1/20/2016     Episodic mood disorder (H) 9/28/2015    9/15: Being seen by psychiatry at [facility].  On Abilify and something else for her anxiety (she cannot remember which medication.)     PTSD (post-traumatic stress disorder) 1/20/2016     Past Surgical History:   Procedure Laterality Date     PE TUBES       TONSILLECTOMY, ADENOIDECTOMY, COMBINED Bilateral 12/26/2016    Procedure: COMBINED TONSILLECTOMY, ADENOIDECTOMY;  Surgeon: Olvin Raymond MD;  Location:  OR       Social History:  Patient reports that she has never smoked. She has never used smokeless tobacco. She reports that she does not drink alcohol or use drugs.    Family History:  Family History   Problem Relation Age of Onset     Asthma Mother      Thyroid Disease Mother         resolved shortly after pregnancy     Other - See Comments Mother         Dx with Lupus on 11/2014     Hypertension Father      Diabetes Maternal Grandfather      Heart Disease Other         maternal great grandparents     Breast Cancer Other         maternal great aunt     Cerebrovascular Disease No family hx of      Glaucoma No family hx of      Macular Degeneration No family hx of        Medications:  Current Outpatient Medications   Medication     DULoxetine (CYMBALTA) 30 MG capsule     DULoxetine (CYMBALTA) 60 MG capsule     LamoTRIgine (LAMICTAL) 250 MG ER tablet     predniSONE (DELTASONE) 10 MG tablet     propranolol 40 MG PO tablet     QUEtiapine 25 MG PO tablet     ketorolac 10 MG PO tablet     Current Facility-Administered Medications  "  Medication     medroxyPROGESTERone (DEPO-PROVERA) injection 150 mg     medroxyPROGESTERone (DEPO-PROVERA) injection 150 mg          Allergies   Allergen Reactions     Nkda [No Known Drug Allergies]          _____________________________________________________________________________    Teledermatology information:  - Location of patient in [place]: Home  - Patient presented as: new patient visit  - Location of teledermatologist:  ([facility] )  - Reason teledermatology is appropriate:  of National Emergency Regarding Coronavirus disease (COVID 19) Outbreak  - Image quality and interpretability: acceptable  - Physician has received verbal consent for a Video/Photos Visit from the patient? Yes  - In-person dermatology visit recommendation: no  - Date of images: 6/15/2020  - Service start time: 3:00 PM  - Service end time: 3:15 PM  - Date of report: 6/15/2020     Teledermatology Nurse Call for NEW Patients (not seen in last 3 years):     The patient was contacted by phone and we reviewed, \"Due to the coronavirus pandemic, we are calling to reschedule your upcoming visit and offer you a teledermatology visit. This would be assessed by an MD or PA-C;  Importantly, \"a teledermatology visit may not be as thorough as an in-person visit, and the quality of the photograph and/or video sent may not be of the same quality as that taken by the dermatology clinic.\" After discussing the risks, benefits, and alternatives, the patient would like to proceed with teledermatology because of National Emergency Regarding Coronavirus disease (COVID 19) Outbreak.\"    This video visit will be conducted via a call between you and your physician/provider via YesVideo. We have found that certain health care needs can be provided without the need for an in-person physical exam.  This service lets us provide the care you need with a video conversation.  If a prescription is necessary we can send it directly to your pharmacy. "  If lab work is needed we can place an order for that and you can then stop by our lab to have the test done at a later time.If during the course of the call the physician/provider feels a video visit is not appropriate, you will not be charged for this service.Visits are billed at different rates depending on your insurance coverage. Please reach out to your insurance provider with any questions.    Patient would like the video invitation sent by: Send to e-mail at: [email or web address]     The patient will also send photographs via [employer] for review.       The patient verified the location of the photo/video to be their home address in [place]. The physician must be notified by nurse if the patient is not in the state of [place] during the encounter     For photos, patient told nursing these are already uploaded     The patient was instructed to take photos of all areas of concern and all areas of any rash.       The patient denied skin pain, fever, mucosal symptoms(lesions, blisters, sores in the mouth, nose, eyes, or genitals)  IF PATIENT ENDORSES ANY OF THESE STOP AND PAGE ON CALL ATTENDING    Additional notes:  Patient summary of issue: body rash that appears in the spring and summer time, happens every year.   Location of problem on body: Facial spreading to whole body   How long has area/symptoms been present: Lifetime   What makes it better?:Prednisone   What makes it worse?:The sun   Other symptoms include the following: Itching, little tiny bumps and redness.   Which mediations have been tried, for how long, and did they make it better or worse (ex. Triamcinolone, used twice daily for 2 weeks, not improved): Currently still taking Prednisone clears up the rash, but not the little red bumps.   The patient has not seen a dermatologist in the past.   The patient hasno past medical history of skin cancer  ROS: The patient is generally feeling well.     Photo instructions reviewed with patients as  below:  -ALL patient needs to send photos unless they have a phone only visit approved by the clinic:  o To send photos to your doctor, respond to the message in [employer] as many times as needed to upload your photos. Each message allows for 3 photos.  o For spots or lesions of concern, please take at least 2 photos of each site you are worried about (at different angles if needed, and at least one close up and one farther away so we can tell where it is on the body. Be sure all photographs are in focus)  o For rashes, take photos of the entire body because it is important for us to see which areas are involved and which areas are not involved.  At a minimum, please include photos of the arms, legs, front of trunk, back of trunk, face, and a few close-ups of the rash.  Leave undergarments in place unless the rash involves the skin in these areas  o For acne, please take photos of the face, upper chest and neck, and upper chest and back  o For hair loss, please send views of the top of the head, sides of the head, back of the head and a picture of your face with your hair pulled back. Also, a photos of both hands with nails.    -For ADULT NEW patients video visits are needed, to receive an invitation and connect with your provider, the [employer] video visit technology must be accessible from your smartphone or personal device. Please click the link below for setup instructions: [email or web address]      Nursing tasks completed  -Pharmacy preference was updated.  -The nurse has dropped in the AVS information *(For adults the phrase is umdermhteleavs and for pediatrics it is their own) for the physician to route in the AVS.                                                                                                                                                                                                                         -The patient was told to contact the clinic if they have not received correspondence  within 72 hours.         Again, thank you for allowing me to participate in the care of your patient.        Sincerely,        Paul Garner MD

## 2020-06-15 NOTE — PROGRESS NOTES
[facility]  Video: ( Invitation sent by:  George and send to e-mail at: [email or web address] )      Impression and Recommendations (Patient Counseled on the Following):    1. 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 Less likely consider solar urticaria given edematous appearance of recent flare. We discussed likely etiology and treatment options including photoprotection, PRN use of topical or systemic steroids for flares, as well as preventative therapies including nbUVB therapy and plaquenil. Will continue PRN use of topical steroids and strict photoprotection, but would be interested in pursuing nbUVB treatment next late winter/spring to prevent flare.     - Start westcort 0.2% ointment BID PRN flares  - Strict photoprotection; recommended using a zinc-oxide containing sunscreen such as Vanicream sunscreen.   - Will arrange follow-up for next January or February and consider starting nbUVB     Follow-up:   Follow-up with dermatology in January or February 2020. Earlier for new or changing lesions or rash.      Staff only:    Paul Garner MD  Pronouns: he/him/his    Department of Dermatology  [facility]: Phone: [phone], Fax:[phone]  [facility]: Phone: [phone] Fax: [phone]  _____________________________________________________________________________    Dermatology Problem List:  1. PMLE  - westcort 0.2% ointment BID PRN  - strict photoprotection  - consider nbUVB phototherapy next spring    Encounter Date: Chase 15, 2020    CC:   Chief Complaint   Patient presents with     Derm Problem     History of Present Illness:  I have reviewed the teledermatology information and the nursing intake corresponding to this issue. Alfreda Hernandez is a 19 year old female  who presents via teledermatology for photosensitive dermatitis on her face and extremities.     Patient reports a many-year history of an itchy skin eruption on her face, neck, chest, arms, and legs. She states this happens during the late spring/ early summer every year for about 3-4 weeks. After sun exposure, about 4-6 hours will develop very itchy rash on sun-exposed areas. She has tried using over-the-counter chemical sunscreens but these have never been helpful. She is  and has a grandmother who had similar issues. Had a recent outbreak in the last 1-2 weeks ago and was started on a course of oral prednisone her primary care provider on 6/8/2020 which she is just completing. She states this was very helpful. She has a history of bipolar depression, but otherwise no other health issues. Denies any other new topical exposures. Health otherwise stable. No other skin concerns.     ROS: Patient is generally feeling well today.     Physical Examination:  General: Well-appearing female, appropriately-developed individual.  Skin: Focused examination within the teledermatology photograph(s) including face, chest, upper and lower extremities was performed.   - Scattered brown macules on malar cheeks bilaterally c/w solar lentigenes  - Few brown macules on legs and arms c/w PIH  - Photos from recent flare also reviewed including pink edematous plaques on face, well demarcated pink plaques on legs and arms.     Labs:  None.     Past Medical History:   Patient Active Problem List   Diagnosis     Vitamin D deficiency     Moderate recurrent major depression (H)     Anxiety     Panic attack     Contraception     Panic disorder without agoraphobia     Episodic mood disorder (H)     Bipolar 2 disorder (H)     PTSD (post-traumatic stress disorder)     Myofascial pain     Temporomandibular joint disorder     Tension-type headache     Xerostomia     Elevated blood-pressure reading without diagnosis of hypertension      Chronic bilateral low back pain with bilateral sciatica     Past Medical History:   Diagnosis Date     Acanthosis nigricans      Amblyopia      Bipolar 2 disorder (H) 1/20/2016     Episodic mood disorder (H) 9/28/2015    9/15: Being [facility] by [facility]ry [facility].  On Abilify and something else for her anxiety (she cannot remember which medication.)     PTSD (post-traumatic stress disorder) 1/20/2016     Past Surgical History:   Procedure Laterality Date     PE TUBES       TONSILLECTOMY, ADENOIDECTOMY, COMBINED Bilateral 12/26/2016    Procedure: COMBINED TONSILLECTOMY, ADENOIDECTOMY;  Surgeon: Olvin Raymond MD;  Location:  OR       Social History:  Patient reports that she has never smoked. She has never used smokeless tobacco. She reports that she does not drink alcohol or use drugs.    Family History:  Family History   Problem Relation Age of Onset     Asthma Mother      Thyroid Disease Mother         resolved shortly after pregnancy     Other - See Comments Mother         Dx with Lupus on 11/2014     Hypertension Father      Diabetes Maternal Grandfather      Heart Disease Other         maternal great grandparents     Breast Cancer Other         maternal great aunt     Cerebrovascular Disease No family hx of      Glaucoma No family hx of      Macular Degeneration No family hx of        Medications:  Current Outpatient Medications   Medication     DULoxetine (CYMBALTA) 30 MG capsule     DULoxetine (CYMBALTA) 60 MG capsule     LamoTRIgine (LAMICTAL) 250 MG ER tablet     predniSONE (DELTASONE) 10 MG tablet     propranolol 40 MG PO tablet     QUEtiapine 25 MG PO tablet     ketorolac 10 MG PO tablet     Current Facility-Administered Medications   Medication     medroxyPROGESTERone (DEPO-PROVERA) injection 150 mg     medroxyPROGESTERone (DEPO-PROVERA) injection 150 mg          Allergies   Allergen Reactions     Nkda [No Known Drug Allergies]   "        _____________________________________________________________________________    Teledermatology information:  - Location of patient in [place]: Home  - Patient presented as: new patient visit  - Location of teledermatologist:  [facility] )  - Reason teledermatology is appropriate:  of National Emergency Regarding Coronavirus disease (COVID 19) Outbreak  - Image quality and interpretability: acceptable  - Physician has received verbal consent for a Video/Photos Visit from the patient? Yes  - In-person dermatology visit recommendation: no  - Date of images: 6/15/2020  - Service start time: 3:00 PM  - Service end time: 3:15 PM  - Date of report: 6/15/2020     Teledermatology Nurse Call for NEW Patients (not seen in last 3 years):     The patient was contacted by phone and we reviewed, \"Due to the coronavirus pandemic, we are calling to reschedule your upcoming visit and offer you a teledermatology visit. This would be assessed by an MD or PALeonelaC;  Importantly, \"a teledermatology visit may not be as thorough as an in-person visit, and the quality of the photograph and/or video sent may not be of the same quality as that taken by the dermatology clinic.\" After discussing the risks, benefits, and alternatives, the patient would like to proceed with teledermatology because of National Emergency Regarding Coronavirus disease (COVID 19) Outbreak.\"    This video visit will be conducted via a call between you and your physician/provider via [employer]ie. We have found that certain health care needs can be provided without the need for an in-person physical exam.  This service lets us provide the care you need with a video conversation.  If a prescription is necessary we can send it directly to your pharmacy.  If lab work is needed we can place an order for that and you can then stop by our lab to have the test done at a later time.If during the course of the call the physician/provider feels a video visit " is not appropriate, you will not be charged for this service.Visits are billed at different rates depending on your insurance coverage. Please reach out to your insurance provider with any questions.    Patient would like the video invitation sent by: Send to e-mail at: [email or web address]     The patient will also send photographs via [employer] for review.       The patient verified the location of the photo/video to be their home address in [place]. The physician must be notified by nurse if the patient is not in the state of [place] during the encounter     For photos, patient told nursing these are already uploaded     The patient was instructed to take photos of all areas of concern and all areas of any rash.       The patient denied skin pain, fever, mucosal symptoms(lesions, blisters, sores in the mouth, nose, eyes, or genitals)  IF PATIENT ENDORSES ANY OF THESE STOP AND PAGE ON CALL ATTENDING    Additional notes:  Patient summary of issue: body rash that appears in the spring and summer time, happens every year.   Location of problem on body: Facial spreading to whole body   How long has area/symptoms been present: Lifetime   What makes it better?:Prednisone   What makes it worse?:The sun   Other symptoms include the following: Itching, little tiny bumps and redness.   Which mediations have been tried, for how long, and did they make it better or worse (ex. Triamcinolone, used twice daily for 2 weeks, not improved): Currently still taking Prednisone clears up the rash, but not the little red bumps.   The patient has not seen a dermatologist in the past.   The patient hasno past medical history of skin cancer  ROS: The patient is generally feeling well.     Photo instructions reviewed with patients as below:  -ALL patient needs to send photos unless they have a phone only visit approved by the clinic:  o To send photos to your doctor, respond to the message in [employer] as many times as needed to upload your  photos. Each message allows for 3 photos.  o For spots or lesions of concern, please take at least 2 photos of each site you are worried about (at different angles if needed, and at least one close up and one farther away so we can tell where it is on the body. Be sure all photographs are in focus)  o For rashes, take photos of the entire body because it is important for us to see which areas are involved and which areas are not involved.  At a minimum, please include photos of the arms, legs, front of trunk, back of trunk, face, and a few close-ups of the rash.  Leave undergarments in place unless the rash involves the skin in these areas  o For acne, please take photos of the face, upper chest and neck, and upper chest and back  o For hair loss, please send views of the top of the head, sides of the head, back of the head and a picture of your face with your hair pulled back. Also, a photos of both hands with nails.    -For ADULT NEW patients video visits are needed, to receive an invitation and connect with your provider, the [employer] video visit technology must be accessible from your smartphone or personal device. Please click the link below for setup instructions: [email or web address]      Nursing tasks completed  -Pharmacy preference was updated.  -The nurse has dropped in the AVS information *(For adults the phrase is umdermhteleavs and for pediatrics it is their own) for the physician to route in the AVS.                                                                                                                                                                                                                         -The patient was told to contact the clinic if they have not received correspondence within 72 hours.

## 2020-06-15 NOTE — PATIENT INSTRUCTIONS
[facility] Teledermatology Visit    Thank you for allowing us to participate in your care. Your findings, instructions and follow-up plan are as follows:    Polymorphous light eruption.   - Recommend strict photoprotection with a sunscreen that contain either titanium dioxide or zinc oxide. Recommend vanicream sunscreen.   - Can use westcort 0.2% ointment twice daily as needed for flares. Do not exceed use more than 1-2 weeks on face.   - Follow-up next winter and will consider nbUVB phototherapy to prevent flares in spring/summer.     When should I call my doctor?    If you are worsening or not improving, please, contact us or seek urgent care as noted below.     Who should I call with questions (adults)?    [facility] (adult and pediatric): [phone]     [facility] (adult): [phone]    For urgent needs outside of business hours call the [facility] at [phone] and ask for the dermatology resident on call    If this is a medical emergency and you are unable to reach an ER, Call [phone]      Who should I call with questions (pediatric)?  [facility]- Pediatric Dermatology  Dr. Mahi Castañeda, Dr. Diane Walden, Dr. Deandra Grajeda, WILLIAM [address] Dr. Chrissy Laird & Dr. Rolando Torres  Non Urgent  Nurse Triage Line; [phone]- Viktoria and Rama SINGLETON Care Coordinators   Sydni (/Complex ) [phone]    If you need a prescription refill, please contact your pharmacy. Refills are approved or denied by our Physicians during normal business hours, Monday through Fridays  Per office policy, refills will not be granted if you have not been seen within the past year (or sooner depending on your child's condition)    Scheduling Information:  Pediatric Appointment Scheduling and Call Center [phone]  Radiology Scheduling- [phone]  Sedation  Unit Scheduling- [phone]  [place] Scheduling- General [phone]; Pediatric Dermatology [phone]  Main  Services: [phone]  Lithuanian: [phone]  Congolese: [phone]  Hmong/Fito/Sinhala: [phone]  Preadmission Nursing Department Fax Number: [phone] (Fax all pre-operative paperwork to this number)    For urgent matters arising during evenings, weekends, or holidays that cannot wait for normal business hours please call [phone] and ask for the Dermatology Resident On-Call to be paged.

## 2020-06-16 ENCOUNTER — TELEPHONE (OUTPATIENT)
Dept: DERMATOLOGY | Facility: CLINIC | Age: 20
End: 2020-06-16

## 2020-06-16 DIAGNOSIS — L56.4 POLYMORPHOUS LIGHT ERUPTION: Primary | ICD-10-CM

## 2020-06-16 NOTE — TELEPHONE ENCOUNTER
PA Initiation    Medication: hydorcortisone valerate 0.2% oint -   Insurance Company: [facility] - Phone [phone] Fax [phone]  Pharmacy Filling the Rx: [facility] [address] [place] [address] [facility]  Filling Pharmacy Phone: [phone]  Filling Pharmacy Fax: [phone]  Start Date: 6/16/2020

## 2020-06-19 NOTE — TELEPHONE ENCOUNTER
Left message for patient to call [facility] in [place] back at [phone].     Spoke with pharmacy who states cash price will be $300.    Routing to Dr. Garner to see if he would like to try the alternative suggested in the denial    Formulary Alternative(s):  Desonide, Hydrocortisone 2.5%    Suzi Allen LPN

## 2020-06-19 NOTE — TELEPHONE ENCOUNTER
"PRIOR AUTHORIZATION DENIED    Medication: hydorcortisone valerate 0.2% oint - DENIED    Denial Date: 6/16/2020    Denial Rational:     Patient must have a history of trial & failure to or have a contraindication and/or intolerance of the formulary alternative(s).  Formulary Alternative(s):  Desonide, Hydrocortisone 2.5%        Appeal Information:     **Please advise if appeal is necessary and place a letter of medical necessity with clinical rationale under the \"letters\" tab in patient's chart and route back to [facility] MED [[id number]]          "

## 2020-06-22 RX ORDER — DESONIDE 0.5 MG/G
OINTMENT TOPICAL
Qty: 60 G | Refills: 11 | Status: SHIPPED | OUTPATIENT
Start: 2020-06-22 | End: 2022-04-26

## 2020-06-22 NOTE — TELEPHONE ENCOUNTER
Pt called, No answer. Left detailed message with Rx information and to call back with any questions or concerns.Ce Garner, MD Alejandro Dixon Ashlie, KISHOREN; Arleth Grey, RN 3 days ago       Can we send over desonide 0.05% ointment 60g with 11 refills as alternative? Apply twice daily as needed for rash on face, trunk, or extremities.     Thanks!     Paul Garner MD   Pronouns: he/him/his      Department of Dermatology   [facility]: Phone: [phone], Fax:[phone]   [facility]: Phone: [phone] Fax: [phone]

## 2020-06-25 DIAGNOSIS — G43.809 OTHER MIGRAINE WITHOUT STATUS MIGRAINOSUS, NOT INTRACTABLE: ICD-10-CM

## 2020-06-26 RX ORDER — PROPRANOLOL HYDROCHLORIDE 40 MG/1
TABLET ORAL
Qty: 60 TABLET | Refills: 0 | Status: SHIPPED | OUTPATIENT
Start: 2020-06-26 | End: 2020-08-06

## 2020-08-30 NOTE — PROGRESS NOTES
SUBJECTIVE:                                                    Alfreda Hernandez is a 16 year old female who presents to clinic today with mother because of:    Chief Complaint   Patient presents with     Ear Problem        HPI  ENT Symptoms             Symptoms: cc Present Absent Comment   Fever/Chills  x     Fatigue  x     Muscle Aches  x     Eye Irritation   x    Sneezing   x    Nasal Carlos/Drg  x     Sinus Pressure/Pain  x     Loss of smell  x     Dental pain   x    Sore Throat   x    Swollen Glands  x     Ear Pain/Fullness  x     Cough   x    Wheeze   x    Chest Pain   x    Shortness of breath   x    Rash   x    Other         Symptom duration: 2-3 days ago   Symptom severity:  moderate   Treatments tried:  ibuprofen,cold medication otc      Contacts:           Allergies   Allergen Reactions     Nkda [No Known Drug Allergies]        Past Medical History:   Diagnosis Date     Acanthosis nigricans      Amblyopia      Bipolar 2 disorder (H) 1/20/2016     Episodic mood disorder (H) 9/28/2015    9/15: Being seen by psychiatry at [facility].  On Abilify and something else for her anxiety (she cannot remember which medication.)     NO ACTIVE PROBLEMS 9/24/2014     PTSD (post-traumatic stress disorder) 1/20/2016     Strep throat          Current Outpatient Prescriptions on File Prior to Visit:  albuterol (PROAIR HFA, PROVENTIL HFA, VENTOLIN HFA) 108 (90 BASE) MCG/ACT inhaler Inhale 2 puffs into the lungs every 6 hours as needed for shortness of breath / dyspnea or wheezing   lamoTRIgine (LAMICTAL) 100 MG tablet Take 200 mg by mouth daily   medroxyPROGESTERone (DEPO-PROVERA) 150 MG/ML injection Inject 1 mL (150 mg) into the muscle every 3 months   [DISCONTINUED] medroxyPROGESTERone (DEPO-PROVERA) 150 MG/ML injection Inject 1 mL (150 mg) into the muscle every 3 months     No current facility-administered medications on file prior to visit.     Social History   Substance Use Topics     Smoking status: Never Smoker      Smokeless tobacco: Never Used      Comment: non smoking household     Alcohol use No       ROS:  Consitutional: As above  ENT: As above  Respiratory: As above    OBJECTIVE:  /81  Pulse 80  Temp 96.4  F (35.8  C) (Oral)  Wt 114 lb (51.7 kg)  SpO2 100%  BMI 19.57 kg/m2  GENERAL APPEARANCE: healthy, alert and no distress  EYES: conjunctiva clear  EARS: No cerumen.   Ear canals w/o erythema. Left TM is red.    NOSE/MOUTH: Nose and mouth without ulcers, erythema or lesions  SINUSES: No maxillary sinus tenderness.  THROAT: No erythema w/o tonsillar enlargement . No exudates  NECK: supple, nontender, no lymphadenopathy  RESP: lungs clear to auscultation - no rales, rhonchi or wheezes  CV: regular rates and rhythm, normal S1 S2, no murmur noted  NEURO: awake, alert      ASSESSMENT: Well appearing.    ICD-10-CM    1. Acute suppurative otitis media of left ear without spontaneous rupture of tympanic membrane, recurrence not specified H66.002 amoxicillin (AMOXIL) 875 MG tablet     albuterol (PROAIR HFA/PROVENTIL HFA/VENTOLIN HFA) 108 (90 BASE) MCG/ACT Inhaler     PLAN:  Lots of rest and fluids.  RTC if any worsening symptoms or if not improving.    Chana White PA-C          The patient is a 53y Male complaining of back pain/injury.

## 2020-09-08 ENCOUNTER — OFFICE VISIT (OUTPATIENT)
Dept: PEDIATRICS | Facility: CLINIC | Age: 20
End: 2020-09-08
Payer: COMMERCIAL

## 2020-09-08 VITALS
BODY MASS INDEX: 25.23 KG/M2 | TEMPERATURE: 97.1 F | OXYGEN SATURATION: 98 % | DIASTOLIC BLOOD PRESSURE: 78 MMHG | HEART RATE: 72 BPM | HEIGHT: 66 IN | WEIGHT: 157 LBS | SYSTOLIC BLOOD PRESSURE: 108 MMHG

## 2020-09-08 DIAGNOSIS — Z78.9 USES BIRTH CONTROL: Primary | ICD-10-CM

## 2020-09-08 DIAGNOSIS — G43.809 OTHER MIGRAINE WITHOUT STATUS MIGRAINOSUS, NOT INTRACTABLE: ICD-10-CM

## 2020-09-08 LAB — HCG UR QL: NEGATIVE

## 2020-09-08 PROCEDURE — 99213 OFFICE O/P EST LOW 20 MIN: CPT | Performed by: NURSE PRACTITIONER

## 2020-09-08 PROCEDURE — 81025 URINE PREGNANCY TEST: CPT | Performed by: NURSE PRACTITIONER

## 2020-09-08 PROCEDURE — 96372 THER/PROPH/DIAG INJ SC/IM: CPT | Performed by: NURSE PRACTITIONER

## 2020-09-08 RX ORDER — MEDROXYPROGESTERONE ACETATE 150 MG/ML
150 INJECTION, SUSPENSION INTRAMUSCULAR
Status: ACTIVE | OUTPATIENT
Start: 2020-09-08 | End: 2021-09-03

## 2020-09-08 RX ORDER — KETOROLAC TROMETHAMINE 10 MG/1
10 TABLET, FILM COATED ORAL EVERY 6 HOURS PRN
Qty: 12 TABLET | Refills: 1 | Status: SHIPPED | OUTPATIENT
Start: 2020-09-08 | End: 2022-04-26

## 2020-09-08 RX ORDER — QUETIAPINE FUMARATE 50 MG/1
TABLET, FILM COATED ORAL
COMMUNITY
Start: 2020-04-27 | End: 2022-01-13

## 2020-09-08 RX ORDER — DEXTROAMPHETAMINE SACCHARATE, AMPHETAMINE ASPARTATE MONOHYDRATE, DEXTROAMPHETAMINE SULFATE AND AMPHETAMINE SULFATE 5; 5; 5; 5 MG/1; MG/1; MG/1; MG/1
CAPSULE, EXTENDED RELEASE ORAL
COMMUNITY
Start: 2020-08-25

## 2020-09-08 RX ADMIN — MEDROXYPROGESTERONE ACETATE 150 MG: 150 INJECTION, SUSPENSION INTRAMUSCULAR at 16:30

## 2020-09-08 ASSESSMENT — PAIN SCALES - GENERAL: PAINLEVEL: NO PAIN (0)

## 2020-09-08 ASSESSMENT — MIFFLIN-ST. JEOR: SCORE: 1490.96

## 2020-09-08 NOTE — NURSING NOTE
Clinic Administered Medication Documentation      Depo Provera Documentation    URINE HCG: negative    Depo-Provera Standing Order inclusion/exclusion criteria reviewed.   Patient meets: inclusion criteria     BP: 108/78  LAST PAP/EXAM: No results found for: PAP    Prior to injection, verified patient identity using patient's name and date of birth. Medication was administered. Please see MAR and medication order for additional information.     Was entire vial of medication used? Yes  Vial/Syringe: Single dose vial  Expiration Date:  10/2021    Patient instructed to remain in clinic for 15 minutes and report any adverse reaction to staff immediately .  NEXT INJECTION DUE: 11/24/20 - 12/8/20

## 2020-09-08 NOTE — PROGRESS NOTES
"Subjective     Alfreda Hernandez is a 19 year old female who presents to clinic today for the following health issues:    HPI     Refill medications depo and ketorolac.     She is  one day later for Depo.  She does not get a period.  NO concerns there.  Her migraines are bad and she did not follow up with Adult Neurology.  She is getting migraines once a week and usually will need to take 1 Toradol and then will need to take a second one lately.  She would like to go there.  She does need a refill of her Toradol. She is taking the propranolol for her migraines and not really helping but this is helping her blood pressure.      Review of Systems   Constitutional, neuro, ENT, endocrine, pulmonary, cardiac, gastrointestinal, genitourinary, musculoskeletal, integument and psychiatric systems are negative, except as otherwise noted.      Objective    /78   Pulse 72   Temp 97.1  F (36.2  C) (Tympanic)   Ht 5' 5.5\" (1.664 m)   Wt 157 lb (71.2 kg)   SpO2 98%   BMI 25.73 kg/m    Body mass index is 25.73 kg/m .  Physical Exam   GENERAL: healthy, alert and no distress  EYES: Eyes grossly normal to inspection, PERRL and conjunctivae and sclerae normal  HENT: ear canals and TM's normal, nose and mouth without ulcers or lesions  NECK: no adenopathy, no asymmetry, masses, or scars and thyroid normal to palpation  RESP: lungs clear to auscultation - no rales, rhonchi or wheezes  CV: regular rate and rhythm, normal S1 S2, no S3 or S4, no murmur, click or rub, no peripheral edema and peripheral pulses strong  ABDOMEN: soft, nontender, no hepatosplenomegaly, no masses and bowel sounds normal  MS: no gross musculoskeletal defects noted, no edema  NEURO: Normal strength and tone, mentation intact and speech normal    Results for orders placed or performed in visit on 09/08/20 (from the past 24 hour(s))   HCG Qual, Urine ([id number])   Result Value Ref Range    HCG Qual Urine Negative NEG^Negative           Assessment & Plan " "    Contraception  OK to proceed with Depo and will continue every 3 months.  - HCG Qual, Urine ([id number])  - medroxyPROGESTERone (DEPO-PROVERA) injection 150 mg    Other migraine without status migrainosus, not intractable  Refilled her Toradol and referred to headache clinic at [facility].  - NEUROLOGY ADULT REFERRAL  - ketorolac (TORADOL) 10 MG tablet; Take 1 tablet (10 mg) by mouth every 6 hours as needed for moderate pain     BMI:   Estimated body mass index is 25.73 kg/m  as calculated from the following:    Height as of this encounter: 5' 5.5\" (1.664 m).    Weight as of this encounter: 157 lb (71.2 kg).           See Patient Instructions    Return for prn.    Emma Swift, PNP, APRN CNP  [facility]    "

## 2020-09-17 ENCOUNTER — MYC MEDICAL ADVICE (OUTPATIENT)
Dept: PEDIATRICS | Facility: CLINIC | Age: 20
End: 2020-09-17

## 2020-09-17 NOTE — LETTER
November 17, 2020      Alfreda Hernandez  [address] [address]        Dear Alfreda Hernandez,    In order to ensure that we are providing the best quality care, we would like to remind you that you are due for a return virtual appointment with Dr Garner around January or February 2021.      We have been unable to reach you by phone (or [facility]). Please call your clinic or use [facility] to make an appointment with your provider before you run out of medication.  Please let us know if you have any questions and we would be happy to help.     Thank you for trusting us with your care.    Sincerely,     [facility]  [phone]

## 2020-11-17 NOTE — TELEPHONE ENCOUNTER
3rd attempt. Patient has not called to schedule.  Appointment reminder letter sent to patient.      Ce Jensen  Surgical Specialties Procedure   [employer] [place]  11/17/2020 7:45 AM

## 2020-11-22 ENCOUNTER — HEALTH MAINTENANCE LETTER (OUTPATIENT)
Age: 20
End: 2020-11-22

## 2020-12-14 ENCOUNTER — ALLIED HEALTH/NURSE VISIT (OUTPATIENT)
Dept: NURSING | Facility: CLINIC | Age: 20
End: 2020-12-14
Payer: COMMERCIAL

## 2020-12-14 DIAGNOSIS — Z30.9 CONTRACEPTIVE MANAGEMENT: Primary | ICD-10-CM

## 2020-12-14 LAB — HCG UR QL: NEGATIVE

## 2020-12-14 PROCEDURE — 81025 URINE PREGNANCY TEST: CPT | Performed by: NURSE PRACTITIONER

## 2020-12-14 PROCEDURE — 96372 THER/PROPH/DIAG INJ SC/IM: CPT

## 2020-12-14 RX ADMIN — MEDROXYPROGESTERONE ACETATE 150 MG: 150 INJECTION, SUSPENSION INTRAMUSCULAR at 14:19

## 2020-12-14 NOTE — NURSING NOTE
Clinic Administered Medication Documentation      Depo Provera Documentation    URINE HCG: negative    Depo-Provera Standing Order inclusion/exclusion criteria reviewed.   Patient meets: inclusion criteria     BP: Data Unavailable  LAST PAP/EXAM: No results found for: PAP    Prior to injection, verified patient identity using patient's name and date of birth. Medication was administered. Please see MAR and medication order for additional information.     Was entire vial of medication used? Yes  Vial/Syringe: Single dose vial  Expiration Date:  6/2022    Patient instructed to remain in clinic for 15 minutes and report any adverse reaction to staff immediately .  NEXT INJECTION DUE: 3/1/21 - 3/15/21    Yolanda Gar, CMA

## 2021-01-10 ENCOUNTER — OFFICE VISIT (OUTPATIENT)
Dept: URGENT CARE | Facility: URGENT CARE | Age: 21
End: 2021-01-10
Payer: COMMERCIAL

## 2021-01-10 VITALS
WEIGHT: 155 LBS | RESPIRATION RATE: 16 BRPM | OXYGEN SATURATION: 97 % | TEMPERATURE: 99 F | BODY MASS INDEX: 25.4 KG/M2 | HEART RATE: 88 BPM

## 2021-01-10 DIAGNOSIS — R19.00 ABDOMINAL MASS, UNSPECIFIED ABDOMINAL LOCATION: Primary | ICD-10-CM

## 2021-01-10 PROCEDURE — 99214 OFFICE O/P EST MOD 30 MIN: CPT | Performed by: PHYSICIAN ASSISTANT

## 2021-01-10 ASSESSMENT — ENCOUNTER SYMPTOMS
DIARRHEA: 0
COLOR CHANGE: 0
CONSTIPATION: 0
RESPIRATORY NEGATIVE: 1
ABDOMINAL PAIN: 1
HEMATOCHEZIA: 0
FREQUENCY: 0
FLANK PAIN: 0
FATIGUE: 0
HEMATURIA: 0
VOMITING: 0
CHILLS: 0
PALPITATIONS: 0
COUGH: 0
HEARTBURN: 0
SHORTNESS OF BREATH: 0
DYSURIA: 0
CARDIOVASCULAR NEGATIVE: 1
WHEEZING: 0
FEVER: 0
CHEST TIGHTNESS: 0
WOUND: 0
NAUSEA: 0

## 2021-01-10 NOTE — PROGRESS NOTES
Subjective   Alfreda is a 20 year old who presents to clinic today for the following health issues  accompanied by her grandmother:  HPI   Abdominal mass  Onset/Duration: 1week  Description:   Character: Dull ache, mass.  No redness, drainage or fevers.  Location: epigastric region  Radiation: None  Intensity: moderate  Progression of Symptoms:  worsening  Accompanying Signs & Symptoms:  Fever/Chills: no  Gas/Bloating: no  Nausea: no  Vomitting: no  Diarrhea: no  Constipation: no  Dysuria or Hematuria: no.  No vaginal d/c, bleeding, rashes and irritation.  No new partners.   History:   Trauma: no  Previous similar pain: no  Previous tests done: none  Precipitating factors:   Does the pain change with:     Food: no    Bowel Movement: no    Urination: no   Other factors:  no  Therapies tried and outcome: tylenol, ibuprofen with minimal relief    No LMP recorded. Patient has had an injection.    Patient Active Problem List   Diagnosis     Vitamin D deficiency     Moderate recurrent major depression (H)     Anxiety     Panic attack     Contraception     Panic disorder without agoraphobia     Episodic mood disorder (H)     Bipolar 2 disorder (H)     PTSD (post-traumatic stress disorder)     Intractable migraine without aura and with status migrainosus     Myofascial pain     Temporomandibular joint disorder     Tension-type headache     Xerostomia     Elevated blood-pressure reading without diagnosis of hypertension     Chronic bilateral low back pain with bilateral sciatica     Current Outpatient Medications   Medication     amphetamine-dextroamphetamine (ADDERALL XR) 20 MG 24 hr capsule     desonide (DESOWEN) 0.05 % external ointment     DULoxetine (CYMBALTA) 30 MG capsule     DULoxetine (CYMBALTA) 60 MG capsule     hydrocortisone valerate (WEST-CHANI) 0.2 % external ointment     ketorolac (TORADOL) 10 MG tablet     ketorolac 10 MG PO tablet     LamoTRIgine (LAMICTAL) 250 MG ER tablet     propranolol (INDERAL) 40 MG  tablet     QUEtiapine (SEROQUEL) 50 MG tablet     Current Facility-Administered Medications   Medication     medroxyPROGESTERone (DEPO-PROVERA) injection 150 mg     medroxyPROGESTERone (DEPO-PROVERA) injection 150 mg     medroxyPROGESTERone (DEPO-PROVERA) injection 150 mg     Allergies   Allergen Reactions     Nkda [No Known Drug Allergies]        Review of Systems   Constitutional: Negative for chills, fatigue and fever.   Respiratory: Negative.  Negative for cough, chest tightness, shortness of breath and wheezing.    Cardiovascular: Negative.  Negative for chest pain, palpitations and peripheral edema.   Gastrointestinal: Positive for abdominal pain. Negative for constipation, diarrhea, heartburn, hematochezia, nausea and vomiting.   Genitourinary: Negative for dysuria, flank pain, frequency, hematuria, pelvic pain, urgency, vaginal bleeding and vaginal discharge.   Skin: Negative for color change, pallor, rash and wound.   All other systems reviewed and are negative.           Objective    Pulse 88   Temp 99  F (37.2  C) (Oral)   Resp 16   Wt 70.3 kg (155 lb)   SpO2 97%   Breastfeeding No   BMI 25.40 kg/m    Body mass index is 25.4 kg/m .  Physical Exam  Vitals signs and nursing note reviewed.   Constitutional:       General: She is not in acute distress.     Appearance: Normal appearance. She is well-developed and normal weight. She is not ill-appearing.   Cardiovascular:      Rate and Rhythm: Normal rate and regular rhythm.      Pulses: Normal pulses.      Heart sounds: Normal heart sounds, S1 normal and S2 normal. No murmur. No friction rub. No gallop.    Pulmonary:      Effort: Pulmonary effort is normal. No accessory muscle usage or respiratory distress.      Breath sounds: Normal breath sounds and air entry. No decreased breath sounds, wheezing, rhonchi or rales.   Abdominal:      General: Abdomen is flat. Bowel sounds are normal.      Palpations: Abdomen is soft. There is mass (grape size tender  mass present over the epigastric region.). There is no hepatomegaly or splenomegaly.      Tenderness: There is no abdominal tenderness. There is no right CVA tenderness, left CVA tenderness, guarding or rebound. Negative signs include Parks's sign, Rovsing's sign, McBurney's sign, psoas sign and obturator sign.      Hernia: No hernia is present.   Genitourinary:     Comments: Declined pelvic exam.  Skin:     General: Skin is warm and dry.      Findings: No abscess, bruising, erythema, lesion, rash or wound.   Neurological:      Mental Status: She is alert and oriented to person, place, and time.   Psychiatric:         Mood and Affect: Mood normal.         Behavior: Behavior normal.         Thought Content: Thought content normal.         Judgment: Judgment normal.         Assessment/Plan:  Abdominal mass, unspecified abdominal location:  ?lipoma vs cyst vs hematoma vs lesion.  Due to worsening symptoms,  recommend further evaluation and management in the ER.  Will most likely need further workup with labs and/or imaging.  Patient has declined transportation via ambulance and will have family drive her.  Understands risks and benefits of ambulance transfer and she has declined.  Call 911 if worsening symptoms.  She plans to go to [facility] ER.  She left in stable condition with AVS in hand.  F/u with PCP after ER visit.         Prachi Melgar PA-C

## 2021-04-01 ENCOUNTER — OFFICE VISIT (OUTPATIENT)
Dept: FAMILY MEDICINE | Facility: CLINIC | Age: 21
End: 2021-04-01
Payer: COMMERCIAL

## 2021-04-01 VITALS
DIASTOLIC BLOOD PRESSURE: 75 MMHG | SYSTOLIC BLOOD PRESSURE: 135 MMHG | TEMPERATURE: 97.3 F | HEART RATE: 80 BPM | WEIGHT: 153 LBS | BODY MASS INDEX: 25.07 KG/M2

## 2021-04-01 DIAGNOSIS — Z30.013 ENCOUNTER FOR INITIAL PRESCRIPTION OF INJECTABLE CONTRACEPTIVE: Primary | ICD-10-CM

## 2021-04-01 LAB — HCG UR QL: NEGATIVE

## 2021-04-01 PROCEDURE — 81025 URINE PREGNANCY TEST: CPT | Performed by: NURSE PRACTITIONER

## 2021-04-01 PROCEDURE — 96372 THER/PROPH/DIAG INJ SC/IM: CPT | Performed by: NURSE PRACTITIONER

## 2021-04-01 PROCEDURE — 99213 OFFICE O/P EST LOW 20 MIN: CPT | Mod: 25 | Performed by: NURSE PRACTITIONER

## 2021-04-01 RX ORDER — MEDROXYPROGESTERONE ACETATE 150 MG/ML
150 INJECTION, SUSPENSION INTRAMUSCULAR
Status: ACTIVE | OUTPATIENT
Start: 2021-04-01 | End: 2022-03-27

## 2021-04-01 RX ADMIN — MEDROXYPROGESTERONE ACETATE 150 MG: 150 INJECTION, SUSPENSION INTRAMUSCULAR at 14:51

## 2021-04-01 NOTE — NURSING NOTE
Clinic Administered Medication Documentation      Depo Provera Documentation    URINE HCG: negative    Depo-Provera Standing Order inclusion/exclusion criteria reviewed.   Patient meets: inclusion criteria     BP: 135/75  LAST PAP/EXAM: No results found for: PAP    Prior to injection, verified patient identity using patient's name and date of birth. Medication was administered. Please see MAR and medication order for additional information.     Was entire vial of medication used? Yes  Vial/Syringe: Single dose vial  Expiration Date:  8/2022    Patient instructed to remain in clinic for 15 minutes.  NEXT INJECTION DUE: 6/17/21 - 7/1/21    Che Ott MA

## 2021-04-01 NOTE — PATIENT INSTRUCTIONS
Depo shot given today.  Please consider returning in near future for physical with pap for cervical cancer screening and labs.

## 2021-04-01 NOTE — PROGRESS NOTES
"    Assessment & Plan       Encounter for initial prescription of injectable contraceptive  HCG negative, restart depo provera.   - HCG Qual, Urine ([id number])  - medroxyPROGESTERone (DEPO-PROVERA) injection 150 mg    Encouraged patient to return for physical with pap in near future.         BMI:   Estimated body mass index is 25.07 kg/m  as calculated from the following:    Height as of 9/8/20: 1.664 m (5' 5.5\").    Weight as of this encounter: 69.4 kg (153 lb).       See Patient Instructions  Patient Instructions   Depo shot given today.  Please consider returning in near future for physical with pap for cervical cancer screening and labs.           Return in about 4 weeks (around 4/29/2021).    Irma Lau [facility] CAROLINE Gant is a 20 year old who presents for the following health issues     HPI     Patient missed window for depo shot, would like to resume.   Denies side effects.   Denies hx of blood clots, cancers, CV disease, migraines.       Review of Systems   Constitutional, HEENT, cardiovascular, pulmonary, gi and gu systems are negative, except as otherwise noted.      Objective    /75   Pulse 80   Temp 97.3  F (36.3  C)   Wt 69.4 kg (153 lb)   BMI 25.07 kg/m    Body mass index is 25.07 kg/m .  Physical Exam   GENERAL: healthy, alert and no distress  RESP: breathing unlabored  MS: no gross musculoskeletal defects noted, no edema  SKIN: no suspicious lesions or rashes  NEURO: Normal strength and tone, mentation intact and speech normal  PSYCH: mentation appears normal, affect normal/bright    Results for EDELMIRA HARRIS (MRN [id number]) as of 4/2/2021 07:05   Ref. Range 4/1/2021 13:53   HCG Qual Urine Latest Ref Range: NEG^Negative  Negative               "

## 2021-04-15 ENCOUNTER — IMMUNIZATION (OUTPATIENT)
Dept: NURSING | Facility: CLINIC | Age: 21
End: 2021-04-15
Payer: COMMERCIAL

## 2021-04-15 PROCEDURE — 91300 PR COVID VAC PFIZER DIL RECON 30 MCG/0.3 ML IM: CPT

## 2021-04-15 PROCEDURE — 0001A PR COVID VAC PFIZER DIL RECON 30 MCG/0.3 ML IM: CPT

## 2021-05-06 ENCOUNTER — IMMUNIZATION (OUTPATIENT)
Dept: NURSING | Facility: CLINIC | Age: 21
End: 2021-05-06
Attending: INTERNAL MEDICINE
Payer: COMMERCIAL

## 2021-05-06 PROCEDURE — 91300 PR COVID VAC PFIZER DIL RECON 30 MCG/0.3 ML IM: CPT

## 2021-05-06 PROCEDURE — 0002A PR COVID VAC PFIZER DIL RECON 30 MCG/0.3 ML IM: CPT

## 2021-05-08 DIAGNOSIS — G43.809 OTHER MIGRAINE WITHOUT STATUS MIGRAINOSUS, NOT INTRACTABLE: ICD-10-CM

## 2021-05-10 RX ORDER — PROPRANOLOL HYDROCHLORIDE 40 MG/1
TABLET ORAL
Qty: 180 TABLET | Refills: 0 | Status: SHIPPED | OUTPATIENT
Start: 2021-05-10 | End: 2022-01-13

## 2021-06-28 ENCOUNTER — OFFICE VISIT (OUTPATIENT)
Dept: FAMILY MEDICINE | Facility: CLINIC | Age: 21
End: 2021-06-28
Payer: COMMERCIAL

## 2021-06-28 VITALS
OXYGEN SATURATION: 96 % | HEART RATE: 110 BPM | DIASTOLIC BLOOD PRESSURE: 75 MMHG | BODY MASS INDEX: 24.25 KG/M2 | SYSTOLIC BLOOD PRESSURE: 125 MMHG | WEIGHT: 148 LBS

## 2021-06-28 DIAGNOSIS — B36.0 TINEA VERSICOLOR: Primary | ICD-10-CM

## 2021-06-28 PROCEDURE — 96372 THER/PROPH/DIAG INJ SC/IM: CPT | Performed by: NURSE PRACTITIONER

## 2021-06-28 PROCEDURE — 99213 OFFICE O/P EST LOW 20 MIN: CPT | Mod: 25 | Performed by: NURSE PRACTITIONER

## 2021-06-28 RX ORDER — KETOCONAZOLE 20 MG/G
CREAM TOPICAL 2 TIMES DAILY
Qty: 60 G | Refills: 1 | Status: SHIPPED | OUTPATIENT
Start: 2021-06-28 | End: 2022-04-26

## 2021-06-28 RX ADMIN — MEDROXYPROGESTERONE ACETATE 150 MG: 150 INJECTION, SUSPENSION INTRAMUSCULAR at 08:53

## 2021-06-28 NOTE — PROGRESS NOTES
"    Assessment & Plan     Tinea versicolor  Start daily Selsun Blue shampoo to affected areas, leave on several minutes prior to rinsing.   Ketoconazole cream BID for two weeks or until resolved.     - ketoconazole (NIZORAL) 2 % external cream; Apply topically 2 times daily     BMI:   Estimated body mass index is 24.25 kg/m  as calculated from the following:    Height as of 9/8/20: 1.664 m (5' 5.5\").    Weight as of this encounter: 67.1 kg (148 lb).       Return in about 4 weeks (around 7/26/2021) for Annual physical exam with pap.    Irma Lau, [facility]   Alfreda is a 20 year old who presents for the following health issues     HPI       Patient reports concerns regarding dark pigmented patches on her neck.   Present for the last month.  Denies any itching or pain.  No open areas or flaking.   Tried steroid cream she has at home without any improvement.   Reports she had a similar skin issue when she was younger, but none recently.  Doesn't recall diagnosis or treatment at that time.   History of polymorphous light eruption, this has been stable.        Review of Systems   Constitutional, HEENT, cardiovascular, pulmonary, gi and gu systems are negative, except as otherwise noted.      Objective    /75   Pulse 110   Wt 67.1 kg (148 lb)   SpO2 96%   BMI 24.25 kg/m    Body mass index is 24.25 kg/m .  Physical Exam   GENERAL: healthy, alert and no distress  RESP: lungs clear to auscultation - no rales, rhonchi or wheezes  CV: regular rate and rhythm, normal S1 S2, no S3 or S4, no murmur, click or rub, no peripheral edema and peripheral pulses strong  MS: no gross musculoskeletal defects noted, no edema  SKIN:  Flat tan patches at base of neck, a few on anterior neck  NEURO: Normal strength and tone, mentation intact and speech normal  PSYCH: mentation appears normal, affect normal/bright          "

## 2021-06-28 NOTE — PATIENT INSTRUCTIONS
Try Selsun blue shampoo- leave on the skin for 5 minutes before washing off in the shower, use daily.   Apply ketoconazole cream twice daily until resolved.   Can restart as needed.

## 2021-09-19 ENCOUNTER — HEALTH MAINTENANCE LETTER (OUTPATIENT)
Age: 21
End: 2021-09-19

## 2021-10-27 ENCOUNTER — OFFICE VISIT (OUTPATIENT)
Dept: FAMILY MEDICINE | Facility: CLINIC | Age: 21
End: 2021-10-27
Payer: COMMERCIAL

## 2021-10-27 VITALS
WEIGHT: 140 LBS | HEIGHT: 66 IN | BODY MASS INDEX: 22.5 KG/M2 | DIASTOLIC BLOOD PRESSURE: 82 MMHG | OXYGEN SATURATION: 99 % | HEART RATE: 87 BPM | SYSTOLIC BLOOD PRESSURE: 135 MMHG | TEMPERATURE: 98.5 F

## 2021-10-27 DIAGNOSIS — Z30.42 ENCOUNTER FOR SURVEILLANCE OF INJECTABLE CONTRACEPTIVE: Primary | ICD-10-CM

## 2021-10-27 LAB — HCG UR QL: NEGATIVE

## 2021-10-27 PROCEDURE — 96372 THER/PROPH/DIAG INJ SC/IM: CPT | Performed by: NURSE PRACTITIONER

## 2021-10-27 PROCEDURE — 81025 URINE PREGNANCY TEST: CPT | Performed by: NURSE PRACTITIONER

## 2021-10-27 PROCEDURE — 99213 OFFICE O/P EST LOW 20 MIN: CPT | Mod: 25 | Performed by: NURSE PRACTITIONER

## 2021-10-27 RX ADMIN — MEDROXYPROGESTERONE ACETATE 150 MG: 150 INJECTION, SUSPENSION INTRAMUSCULAR at 16:21

## 2021-10-27 ASSESSMENT — PAIN SCALES - GENERAL: PAINLEVEL: NO PAIN (0)

## 2021-10-27 ASSESSMENT — MIFFLIN-ST. JEOR: SCORE: 1403.85

## 2021-10-27 NOTE — PROGRESS NOTES
"  Assessment & Plan     Encounter for surveillance of injectable contraceptive  Missed window. Urine pregnancy negative.  Has active order for depo- expires March 2022.  After that I will want her to return for a physical prior to any further orders.  I have encouraged her to come in for a physical in the past and has not scheduled, no previous pap.   - HCG Qual, Urine ([id number])        Return in about 5 months (around 3/27/2022) for Annual physical exam, pap, Lab Work.    Irma Lau, [facility]   Alfreda is a 21 year old who presents for the following health issues     HPI       Patient missed window for depo shot, would like to resume.   Denies side effects.   Denies hx of blood clots, cancers, CV disease, migraines.     Review of Systems   Constitutional, HEENT, cardiovascular, pulmonary, gi and gu systems are negative, except as otherwise noted.      Objective    /82   Pulse 87   Temp 98.5  F (36.9  C)   Ht 1.664 m (5' 5.5\")   Wt 63.5 kg (140 lb)   SpO2 99%   BMI 22.94 kg/m    Body mass index is 22.94 kg/m .  Physical Exam   GENERAL: healthy, alert and no distress  EYES: Eyes grossly normal to inspection, PERRL and conjunctivae and sclerae normal  RESP: breathing unlabored  MS: no gross musculoskeletal defects noted, no edema  SKIN: no suspicious lesions or rashes  NEURO: Normal strength and tone, mentation intact and speech normal  PSYCH: mentation appears normal, affect normal/bright     Ref. Range 10/27/2021 16:04   HCG Qual Urine Latest Ref Range: Negative  Negative           "

## 2021-10-27 NOTE — NURSING NOTE
Clinic Administered Medication Documentation    Administrations This Visit     medroxyPROGESTERone (DEPO-PROVERA) injection 150 mg     Admin Date  10/27/2021 Action  Given Dose  150 mg Route  Intramuscular Site  Right Ventrogluteal Administered By  Che Ott CMA    Ordering Provider: Irma Lau CNP    Patient Supplied?: No    Comments: LATE                  Depo Provera Documentation    URINE HCG: negative    Depo-Provera Standing Order inclusion/exclusion criteria reviewed.   Patient meets: exclusion criteria     Patient does not meet inclusion criteria to proceed with the injection due to be late for injection. Refer to Depo-Provera Standing Order. Patient advised to see provider.   Patient completed visit with provider.  Che Ott MA

## 2022-01-09 ENCOUNTER — HEALTH MAINTENANCE LETTER (OUTPATIENT)
Age: 22
End: 2022-01-09

## 2022-01-13 ENCOUNTER — OFFICE VISIT (OUTPATIENT)
Dept: URGENT CARE | Facility: URGENT CARE | Age: 22
End: 2022-01-13
Payer: COMMERCIAL

## 2022-01-13 VITALS
TEMPERATURE: 98.1 F | WEIGHT: 136.8 LBS | HEART RATE: 95 BPM | DIASTOLIC BLOOD PRESSURE: 87 MMHG | BODY MASS INDEX: 22.42 KG/M2 | OXYGEN SATURATION: 96 % | SYSTOLIC BLOOD PRESSURE: 133 MMHG

## 2022-01-13 DIAGNOSIS — H66.003 ACUTE SUPPURATIVE OTITIS MEDIA OF BOTH EARS WITHOUT SPONTANEOUS RUPTURE OF TYMPANIC MEMBRANES, RECURRENCE NOT SPECIFIED: Primary | ICD-10-CM

## 2022-01-13 PROCEDURE — 99213 OFFICE O/P EST LOW 20 MIN: CPT | Performed by: PHYSICIAN ASSISTANT

## 2022-01-13 RX ORDER — AZITHROMYCIN 250 MG/1
TABLET, FILM COATED ORAL
Qty: 6 TABLET | Refills: 0 | Status: SHIPPED | OUTPATIENT
Start: 2022-01-13 | End: 2022-01-18

## 2022-01-13 ASSESSMENT — ENCOUNTER SYMPTOMS
COUGH: 0
NECK PAIN: 0
BACK PAIN: 0
ARTHRALGIAS: 0
ALLERGIC/IMMUNOLOGIC NEGATIVE: 1
DIARRHEA: 0
RHINORRHEA: 0
EYES NEGATIVE: 1
DIZZINESS: 0
WEAKNESS: 0
HEADACHES: 0
PALPITATIONS: 0
SORE THROAT: 0
MYALGIAS: 0
ENDOCRINE NEGATIVE: 1
NECK STIFFNESS: 0
JOINT SWELLING: 0
MUSCULOSKELETAL NEGATIVE: 1
NAUSEA: 0
SHORTNESS OF BREATH: 0
LIGHT-HEADEDNESS: 0
WOUND: 0
CARDIOVASCULAR NEGATIVE: 1
CHILLS: 0
RESPIRATORY NEGATIVE: 1
FEVER: 0
VOMITING: 0

## 2022-01-13 NOTE — PROGRESS NOTES
Chief Complaint:    Chief Complaint   Patient presents with     Ear Problem     Bilateral ear pain. Onset- Monday          ASSESSMENT    Vital signs reviewed by Doug Cuellar PA-C  /87 (BP Location: Left arm, Patient Position: Sitting, Cuff Size: Adult Regular)   Pulse 95   Temp 98.1  F (36.7  C) (Tympanic)   Wt 62.1 kg (136 lb 12.8 oz)   SpO2 96%   BMI 22.42 kg/m       1. Acute suppurative otitis media of both ears without spontaneous rupture of tympanic membranes, recurrence not specified         PLAN  Rx for Zithromax with Amoxicillin allergy.  Fluids, vaporizer, acetaminophen, and or ibuprofen for pain.  Follow up with PCP if symptoms are not improving in 1 week. Sooner if symptoms worsen.   Worrisome symptoms discussed with instructions to go to the ED.  Patient verbalized understanding and agreed with this plan.     LABS:    No results found for any visits on 01/13/22.      Respiratory History  occasional episodes of bronchitis    Current Meds    Current Outpatient Medications:      amphetamine-dextroamphetamine (ADDERALL XR) 20 MG 24 hr capsule, TAKE 2 CAPSULES BY MOUTH DAILY, Disp: , Rfl:      azithromycin (ZITHROMAX) 250 MG tablet, Take 2 tablets (500 mg) by mouth daily for 1 day, THEN 1 tablet (250 mg) daily for 4 days., Disp: 6 tablet, Rfl: 0     DULoxetine (CYMBALTA) 30 MG capsule, TAKE 1 CAPSULE BY MOUTH ONCE DAILY IN ADDITION TO THE 60MG CAPSULE AT BEDTIME FOR A TOTAL DOSE OF 90MG DAILY., Disp: , Rfl: 2     DULoxetine (CYMBALTA) 60 MG capsule, TAKE 1 CAPSULE BY MOUTH ONCE DAILY, Disp: , Rfl: 3     ketorolac (TORADOL) 10 MG tablet, Take 1 tablet (10 mg) by mouth every 6 hours as needed for moderate pain, Disp: 12 tablet, Rfl: 1     ketorolac 10 MG PO tablet, Take 1 tablet (10 mg) by mouth every 6 hours as needed for moderate pain, Disp: 12 tablet, Rfl: 1     LamoTRIgine (LAMICTAL) 250 MG ER tablet, Take 250 mg by mouth At Bedtime, Disp: , Rfl:      desonide (DESOWEN) 0.05 % external  ointment, Apply twice daily as needed for rash on face, trunk, or extremities. (Patient not taking: Reported on 4/1/2021), Disp: 60 g, Rfl: 11     hydrocortisone valerate (WEST-CHANI) 0.2 % external ointment, Apply twice daily for up to 2 weeks at time as needed for rash (Patient not taking: Reported on 4/1/2021), Disp: 60 g, Rfl: 11     ketoconazole (NIZORAL) 2 % external cream, Apply topically 2 times daily (Patient not taking: Reported on 10/27/2021), Disp: 60 g, Rfl: 1    Current Facility-Administered Medications:      medroxyPROGESTERone (DEPO-PROVERA) injection 150 mg, 150 mg, Intramuscular, Q90 Days, Irma Lau CNP, 150 mg at 10/27/21 1621     medroxyPROGESTERone (DEPO-PROVERA) injection 150 mg, 150 mg, Intramuscular, Q90 Days, Emma Swift APRN CNP, 150 mg at 06/08/20 1711     medroxyPROGESTERone (DEPO-PROVERA) injection 150 mg, 150 mg, Intramuscular, Q90 Days, Emma Swift APRN CNP, 150 mg at 02/19/20 1250    Problem history  Patient Active Problem List   Diagnosis     Vitamin D deficiency     Moderate recurrent major depression (H)     Anxiety     Panic attack     Contraception     Panic disorder without agoraphobia     Episodic mood disorder (H)     Bipolar 2 disorder (H)     PTSD (post-traumatic stress disorder)     Intractable migraine without aura and with status migrainosus     Myofascial pain     Temporomandibular joint disorder     Tension-type headache     Xerostomia     Elevated blood-pressure reading without diagnosis of hypertension     Chronic bilateral low back pain with bilateral sciatica       Allergies  Allergies   Allergen Reactions     Amoxicillin      bubblegum flavor makes her ill     Augmentin Hives     Other reaction(s): Vomiting     Nkda [No Known Drug Allergies]          SUBJECTIVE    HPI:Alfreda Hernandez is an 21 year old female who presents for possible ear infection. Symptoms include ear pain bilaterally. Onset 3 days, unchanged since  that time. Ear history: few episodes of otitis.    Patient is eating and drinking well.  No fever, diarrhea or vomiting.  No cough, or wheezing.    ROS:    Review of Systems   Constitutional: Negative for chills and fever.   HENT: Positive for ear pain. Negative for congestion, rhinorrhea and sore throat.    Eyes: Negative.    Respiratory: Negative.  Negative for cough and shortness of breath.    Cardiovascular: Negative.  Negative for chest pain and palpitations.   Gastrointestinal: Negative for diarrhea, nausea and vomiting.   Endocrine: Negative.    Genitourinary: Negative.    Musculoskeletal: Negative.  Negative for arthralgias, back pain, joint swelling, myalgias, neck pain and neck stiffness.   Skin: Negative.  Negative for rash and wound.   Allergic/Immunologic: Negative.  Negative for immunocompromised state.   Neurological: Negative for dizziness, weakness, light-headedness and headaches.        Family History   Family History   Problem Relation Age of Onset     Asthma Mother      Thyroid Disease Mother         resolved shortly after pregnancy     Other - See Comments Mother         Dx with Lupus on 11/2014     Hypertension Father      Diabetes Maternal Grandfather      Heart Disease Other         maternal great grandparents     Breast Cancer Other         maternal great aunt     Cerebrovascular Disease No family hx of      Glaucoma No family hx of      Macular Degeneration No family hx of         Social History  Social History     Socioeconomic History     Marital status: Single     Spouse name: Not on file     Number of children: Not on file     Years of education: Not on file     Highest education level: Not on file   Occupational History     Not on file   Tobacco Use     Smoking status: Never Smoker     Smokeless tobacco: Never Used     Tobacco comment: non smoking household   Substance and Sexual Activity     Alcohol use: No     Alcohol/week: 0.0 standard drinks     Drug use: No     Sexual activity:  Yes     Partners: Male     Birth control/protection: Injection   Other Topics Concern     Not on file   Social History Narrative     Not on file     Social Determinants of Health     Financial Resource Strain: Not on file   Food Insecurity: Not on file   Transportation Needs: Not on file   Physical Activity: Not on file   Stress: Not on file   Social Connections: Not on file   Intimate Partner Violence: Not on file   Housing Stability: Not on file        OBJECTIVE     Physical Exam:     Vital signs reviewed by Doug Cuellar PA-C  /87 (BP Location: Left arm, Patient Position: Sitting, Cuff Size: Adult Regular)   Pulse 95   Temp 98.1  F (36.7  C) (Tympanic)   Wt 62.1 kg (136 lb 12.8 oz)   SpO2 96%   BMI 22.42 kg/m       Physical Exam:    Physical Exam  Vitals and nursing note reviewed.   Constitutional:       General: She is not in acute distress.     Appearance: She is well-developed. She is not ill-appearing, toxic-appearing or diaphoretic.   HENT:      Head: Normocephalic and atraumatic.      Right Ear: Hearing, ear canal and external ear normal. No drainage, swelling or tenderness. Tympanic membrane is erythematous and bulging. Tympanic membrane is not perforated or retracted.      Left Ear: Hearing, ear canal and external ear normal. No drainage, swelling or tenderness. Tympanic membrane is erythematous and bulging. Tympanic membrane is not perforated or retracted.      Nose: Congestion present. No mucosal edema or rhinorrhea.      Right Sinus: No maxillary sinus tenderness or frontal sinus tenderness.      Left Sinus: No maxillary sinus tenderness or frontal sinus tenderness.      Mouth/Throat:      Pharynx: No pharyngeal swelling, oropharyngeal exudate, posterior oropharyngeal erythema or uvula swelling.      Tonsils: No tonsillar exudate or tonsillar abscesses. 0 on the right. 0 on the left.   Eyes:      General:         Right eye: No discharge.         Left eye: No discharge.      Pupils: Pupils  are equal, round, and reactive to light.   Cardiovascular:      Rate and Rhythm: Normal rate and regular rhythm.      Heart sounds: Normal heart sounds. No murmur heard.  No friction rub. No gallop.    Pulmonary:      Effort: Pulmonary effort is normal. No respiratory distress.      Breath sounds: Normal breath sounds. No decreased breath sounds, wheezing, rhonchi or rales.   Chest:      Chest wall: No tenderness.   Abdominal:      General: Bowel sounds are normal. There is no distension.      Palpations: Abdomen is soft. There is no mass.      Tenderness: There is no abdominal tenderness. There is no guarding.   Musculoskeletal:      Cervical back: Normal range of motion and neck supple.   Lymphadenopathy:      Head:      Right side of head: No submental, submandibular, tonsillar, preauricular or posterior auricular adenopathy.      Left side of head: No submental, submandibular, tonsillar, preauricular or posterior auricular adenopathy.      Cervical: No cervical adenopathy.      Right cervical: No superficial or posterior cervical adenopathy.     Left cervical: No superficial or posterior cervical adenopathy.   Skin:     General: Skin is warm and dry.      Findings: No rash.   Neurological:      Mental Status: She is alert and oriented to person, place, and time.      Cranial Nerves: No cranial nerve deficit.      Deep Tendon Reflexes: Reflexes are normal and symmetric.   Psychiatric:         Behavior: Behavior normal. Behavior is cooperative.         Thought Content: Thought content normal.         Judgment: Judgment normal.            Doug Cuellar PA-C  1/13/2022, 4:43 PM

## 2022-01-13 NOTE — PATIENT INSTRUCTIONS
Patient Education     Middle Ear Infection (Adult)   You have an infection of the middle ear, the space behind the eardrum. This is also called acute otitis media (AOM). Sometimes it's caused by the common cold. This is because congestion can block the internal passage (eustachian tube) that drains fluid from the middle ear. When the middle ear fills with fluid, bacteria can grow there and cause an infection. Oral antibiotics are used to treat this illness, not ear drops. Symptoms usually start to improve within 1 to 2 days of treatment.    Home care  The following are general care guidelines:    Finish all of the antibiotic medicine given, even though you may feel better after the first few days.    You may use over-the-counter medicine, such as acetaminophen or ibuprofen, to control pain and fever, unless something else was prescribed. Talk with your healthcare provider before using these medicines if you have chronic liver or kidney disease. Also talk with your provider if you have had a stomach ulcer or digestive bleeding. Don't give aspirin to anyone under 18 years of age who has a fever. It may cause severe illness or death.  Follow-up care  Follow up with your healthcare provider in 2 weeks, or as advised, if all symptoms have not gotten better, or if hearing doesn't go back to normal within 1 month.  When to seek medical advice  Call your healthcare provider right away if any of these occur:    Ear pain gets worse or does not improve after 3 days of treatment    Unusual drowsiness or confusion    Neck pain, stiff neck, or headache    Fluid or blood draining from the ear canal    Fever of 100.4 F (38 C) or as advised     Seizure  i[employer] last reviewed this educational content on 9/1/2019 2000-2021 [employer]. All rights reserved. This information is not intended as a substitute for professional medical care. Always follow your healthcare professional's instructions.

## 2022-03-17 ENCOUNTER — OFFICE VISIT (OUTPATIENT)
Dept: FAMILY MEDICINE | Facility: CLINIC | Age: 22
End: 2022-03-17
Payer: COMMERCIAL

## 2022-03-17 VITALS
BODY MASS INDEX: 22.82 KG/M2 | SYSTOLIC BLOOD PRESSURE: 130 MMHG | WEIGHT: 142 LBS | TEMPERATURE: 97 F | DIASTOLIC BLOOD PRESSURE: 84 MMHG | OXYGEN SATURATION: 96 % | HEART RATE: 95 BPM | HEIGHT: 66 IN

## 2022-03-17 DIAGNOSIS — Z30.42 ENCOUNTER FOR SURVEILLANCE OF INJECTABLE CONTRACEPTIVE: Primary | ICD-10-CM

## 2022-03-17 LAB — HCG UR QL: NEGATIVE

## 2022-03-17 PROCEDURE — 81025 URINE PREGNANCY TEST: CPT | Performed by: NURSE PRACTITIONER

## 2022-03-17 PROCEDURE — 99213 OFFICE O/P EST LOW 20 MIN: CPT | Mod: 25 | Performed by: NURSE PRACTITIONER

## 2022-03-17 PROCEDURE — 96372 THER/PROPH/DIAG INJ SC/IM: CPT | Performed by: NURSE PRACTITIONER

## 2022-03-17 RX ORDER — MEDROXYPROGESTERONE ACETATE 150 MG/ML
150 INJECTION, SUSPENSION INTRAMUSCULAR
Status: COMPLETED | OUTPATIENT
Start: 2022-03-17 | End: 2022-03-17

## 2022-03-17 RX ADMIN — MEDROXYPROGESTERONE ACETATE 150 MG: 150 INJECTION, SUSPENSION INTRAMUSCULAR at 14:23

## 2022-03-17 ASSESSMENT — ENCOUNTER SYMPTOMS: BREAST MASS: 0

## 2022-03-17 ASSESSMENT — PAIN SCALES - GENERAL: PAINLEVEL: NO PAIN (0)

## 2022-03-17 NOTE — PATIENT INSTRUCTIONS
Please return for a physical with pap in the next 3 months, before you would need your next depo shot.  I can't prescribe this anymore if you don't follow through.

## 2022-03-17 NOTE — PROGRESS NOTES
"  Assessment & Plan     Encounter for surveillance of injectable contraceptive  No further injections without scheduling a physical/pap.     - medroxyPROGESTERone (DEPO-PROVERA) injection 150 mg  - HCG qualitative urine      Return in about 3 months (around 6/17/2022) for Annual physical exam, pap.    Irma Lau, CNP  [facility]   Alfreda is a 21 year old who presents for the following health issues     HPI     Here for depo injection. Late for window so needs pregnancy test.  At last 2 visits I told her she needed a pap and physical, has not scheduled.  Advised after injection today, no more prescriptions until she gets that done. Discussed the importance of screenings.        Review of Systems   Constitutional, HEENT, cardiovascular, pulmonary, gi and gu systems are negative, except as otherwise noted.      Objective    /84   Pulse 95   Temp 97  F (36.1  C)   Ht 1.664 m (5' 5.5\")   Wt 64.4 kg (142 lb)   SpO2 96%   BMI 23.27 kg/m    Body mass index is 23.27 kg/m .  Physical Exam   GENERAL: healthy, alert and no distress  EYES: Eyes grossly normal to inspection, PERRL and conjunctivae and sclerae normal  RESP: breathing unlabored.   MS: no gross musculoskeletal defects noted, no edema  SKIN: no suspicious lesions or rashes  NEURO: Normal strength and tone, mentation intact and speech normal  PSYCH: mentation appears normal, affect normal/bright       Ref. Range 3/17/2022 14:09   HCG Qual Urine Latest Ref Range: Negative  Negative               "

## 2022-03-17 NOTE — NURSING NOTE
Clinic Administered Medication Documentation    Administrations This Visit     medroxyPROGESTERone (DEPO-PROVERA) injection 150 mg     Admin Date  03/17/2022 Action  Given Dose  150 mg Route  Intramuscular Site  Right Ventrogluteal Administered By  Che Ott CMA    Ordering Provider: Irma Lau CNP    Patient Supplied?: No                  Depo Provera Documentation    URINE HCG: negative    Depo-Provera Standing Order inclusion/exclusion criteria reviewed.   Patient meets: LATE, NEGATIVE HCG.    BP: 130/84  LAST PAP/EXAM: No results found for: PAP    Prior to injection, verified patient identity using patient's name and date of birth. Medication was administered. Please see MAR and medication order for additional information.     Was entire vial of medication used? Yes  Vial/Syringe: Single dose vial  Expiration Date:  8/2023    Patient instructed to remain in clinic for 15 minutes.  NEXT INJECTION DUE: 6/2/22 - 6/16/22  Che Ott MA

## 2022-04-11 DIAGNOSIS — M26.609 TMJ (TEMPOROMANDIBULAR JOINT SYNDROME): Primary | ICD-10-CM

## 2022-04-18 ENCOUNTER — OFFICE VISIT (OUTPATIENT)
Dept: URGENT CARE | Facility: URGENT CARE | Age: 22
End: 2022-04-18
Payer: COMMERCIAL

## 2022-04-18 VITALS
WEIGHT: 143.2 LBS | HEART RATE: 116 BPM | SYSTOLIC BLOOD PRESSURE: 147 MMHG | TEMPERATURE: 99 F | DIASTOLIC BLOOD PRESSURE: 90 MMHG | BODY MASS INDEX: 23.47 KG/M2 | OXYGEN SATURATION: 97 %

## 2022-04-18 DIAGNOSIS — Z11.3 SCREEN FOR STD (SEXUALLY TRANSMITTED DISEASE): Primary | ICD-10-CM

## 2022-04-18 PROCEDURE — 87491 CHLMYD TRACH DNA AMP PROBE: CPT | Performed by: NURSE PRACTITIONER

## 2022-04-18 PROCEDURE — 99213 OFFICE O/P EST LOW 20 MIN: CPT | Performed by: NURSE PRACTITIONER

## 2022-04-18 PROCEDURE — 87591 N.GONORRHOEAE DNA AMP PROB: CPT | Performed by: NURSE PRACTITIONER

## 2022-04-18 NOTE — PROGRESS NOTES
SUBJECTIVE:  Alfreda Hernandez is an 21 year old  Female, , who presents with no symptoms but reports boyfriend has been sexually active with other people and would like testing      Menstrual History:  Menstrual History 2014 10/21/2014   LAST MENSTRUAL PERIOD 2014       Past Medical History:   Diagnosis Date     Acanthosis nigricans      Amblyopia      Bipolar 2 disorder (H) 2016     Episodic mood disorder (H) 2015    9/15: Being seen by psychiatry at [facility] and [facility].  On Abilify and something else for her anxiety (she cannot remember which medication.)     PTSD (post-traumatic stress disorder) 2016        OBJECTIVE:   BP (!) 147/90   Pulse 116   Temp 99  F (37.2  C)   Wt 65 kg (143 lb 3.2 oz)   SpO2 97%   BMI 23.47 kg/m       She appears well, afebrile.  Abdomen: benign, soft, nontender, no masses.  No CVA tenderness to percussion.    ASSESSMENT:   Encounter Diagnosis   Name Primary?     Screen for STD (sexually transmitted disease) Yes          PLAN:     Gonorrhea chlamydia pending  Alfreda to follow up with Primary Care provider regarding elevated blood pressure.    Return to clinic prn if symptoms persist or worsen.    CHEKO Tabor CNP

## 2022-04-20 LAB
C TRACH DNA SPEC QL NAA+PROBE: NEGATIVE
N GONORRHOEA DNA SPEC QL NAA+PROBE: NEGATIVE

## 2022-04-26 ENCOUNTER — OFFICE VISIT (OUTPATIENT)
Dept: OBGYN | Facility: CLINIC | Age: 22
End: 2022-04-26
Payer: COMMERCIAL

## 2022-04-26 VITALS
WEIGHT: 143.2 LBS | HEIGHT: 66 IN | OXYGEN SATURATION: 95 % | BODY MASS INDEX: 23.01 KG/M2 | DIASTOLIC BLOOD PRESSURE: 88 MMHG | HEART RATE: 90 BPM | SYSTOLIC BLOOD PRESSURE: 138 MMHG

## 2022-04-26 DIAGNOSIS — Z11.4 SCREENING FOR HIV (HUMAN IMMUNODEFICIENCY VIRUS): ICD-10-CM

## 2022-04-26 DIAGNOSIS — Z23 NEED FOR TDAP VACCINATION: ICD-10-CM

## 2022-04-26 DIAGNOSIS — Z01.419 ENCOUNTER FOR GYNECOLOGICAL EXAMINATION WITHOUT ABNORMAL FINDING: Primary | ICD-10-CM

## 2022-04-26 DIAGNOSIS — Z30.42 SURVEILLANCE FOR DEPO-PROVERA CONTRACEPTION: ICD-10-CM

## 2022-04-26 DIAGNOSIS — Z11.59 NEED FOR HEPATITIS C SCREENING TEST: ICD-10-CM

## 2022-04-26 PROCEDURE — 87389 HIV-1 AG W/HIV-1&-2 AB AG IA: CPT | Performed by: NURSE PRACTITIONER

## 2022-04-26 PROCEDURE — 36415 COLL VENOUS BLD VENIPUNCTURE: CPT | Performed by: NURSE PRACTITIONER

## 2022-04-26 PROCEDURE — 99395 PREV VISIT EST AGE 18-39: CPT | Mod: 25 | Performed by: NURSE PRACTITIONER

## 2022-04-26 PROCEDURE — G0145 SCR C/V CYTO,THINLAYER,RESCR: HCPCS | Performed by: NURSE PRACTITIONER

## 2022-04-26 PROCEDURE — 90471 IMMUNIZATION ADMIN: CPT | Performed by: NURSE PRACTITIONER

## 2022-04-26 PROCEDURE — 86803 HEPATITIS C AB TEST: CPT | Performed by: NURSE PRACTITIONER

## 2022-04-26 PROCEDURE — 90715 TDAP VACCINE 7 YRS/> IM: CPT | Performed by: NURSE PRACTITIONER

## 2022-04-26 RX ORDER — MEDROXYPROGESTERONE ACETATE 150 MG/ML
150 INJECTION, SUSPENSION INTRAMUSCULAR
Status: DISCONTINUED | OUTPATIENT
Start: 2022-06-10 | End: 2022-08-30

## 2022-04-26 ASSESSMENT — PAIN SCALES - GENERAL: PAINLEVEL: NO PAIN (0)

## 2022-04-26 NOTE — PROGRESS NOTES
Prior to immunization administration, verified patients identity using patient s name and date of birth. Please see Immunization Activity for additional information.     Screening Questionnaire for Adult Immunization    Are you sick today?   No   Do you have allergies to medications, food, a vaccine component or latex?   Yes   Have you ever had a serious reaction after receiving a vaccination?   No   Do you have a long-term health problem with heart, lung, kidney, or metabolic disease (e.g., diabetes), asthma, a blood disorder, no spleen, complement component deficiency, a cochlear implant, or a spinal fluid leak?  Are you on long-term aspirin therapy?   No   Do you have cancer, leukemia, HIV/AIDS, or any other immune system problem?   No   Do you have a parent, brother, or sister with an immune system problem?   No   In the past 3 months, have you taken medications that affect  your immune system, such as prednisone, other steroids, or anticancer drugs; drugs for the treatment of rheumatoid arthritis, Crohn s disease, or psoriasis; or have you had radiation treatments?   No   Have you had a seizure, or a brain or other nervous system problem?   No   During the past year, have you received a transfusion of blood or blood    products, or been given immune (gamma) globulin or antiviral drug?   No   For women: Are you pregnant or is there a chance you could become       pregnant during the next month?   No   Have you received any vaccinations in the past 4 weeks?   No     Immunization questionnaire was positive for at least one answer.  Notified Josie STILL CNP.        Per orders of Josie STILL CNP, injection of Tdap given by Alrfeda Lugo CMA. Patient instructed to remain in clinic for 15 minutes afterwards, and to report any adverse reaction to me immediately.       Screening performed by Alfreda Lugo CMA on 4/26/2022 at 10:40 AM.

## 2022-04-26 NOTE — PROGRESS NOTES
SUBJECTIVE:   CC: Alfreda Hernandez is an 21 year old woman who presents for preventive health visit.     {Healthy Habits:     Getting at least 3 servings of Calcium per day:  NO    Bi-annual eye exam:  NO    Dental care twice a year:  Yes    Sleep apnea or symptoms of sleep apnea:  None    Diet:  Regular (no restrictions)    Frequency of exercise:  2-3 days/week    Duration of exercise:  15-30 minutes    Taking medications regularly:  Yes    Medication side effects:  None    PHQ-2 Total Score: 0    Additional concerns today:  No    Patient currently on Depo Provera for contraception. Not due until June for injection, needs new orders. Has been on it since 2014 but has been a bit inconsistent with it the last 2 years.     Today's PHQ-2 Score:   PHQ-2 ( 1999 [employer]) 4/26/2022   Q1: Little interest or pleasure in doing things 0   Q2: Feeling down, depressed or hopeless 0   PHQ-2 Score 0   Q1: Little interest or pleasure in doing things Not at all   Q2: Feeling down, depressed or hopeless Not at all   PHQ-2 Score 0       Abuse: Current or Past (Physical, Sexual or Emotional) - No  Do you feel safe in your environment? Yes        Social History     Tobacco Use     Smoking status: Never Smoker     Smokeless tobacco: Never Used     Tobacco comment: non smoking household   Substance Use Topics     Alcohol use: No     Alcohol/week: 0.0 standard drinks         Alcohol Use 4/26/2022   Prescreen: >3 drinks/day or >7 drinks/week? No   Prescreen: >3 drinks/day or >7 drinks/week? -   No flowsheet data found.    Reviewed orders with patient.  Reviewed health maintenance and updated orders accordingly - Yes  Patient Active Problem List   Diagnosis     Vitamin D deficiency     Moderate recurrent major depression (H)     Anxiety     Panic attack     Contraception     Panic disorder without agoraphobia     Episodic mood disorder (H)     Bipolar 2 disorder (H)     PTSD (post-traumatic stress disorder)     Intractable migraine without  aura and with status migrainosus     Myofascial pain     Temporomandibular joint disorder     Tension-type headache     Xerostomia     Elevated blood-pressure reading without diagnosis of hypertension     Chronic bilateral low back pain with bilateral sciatica     Past Surgical History:   Procedure Laterality Date     PE TUBES       TONSILLECTOMY, ADENOIDECTOMY, COMBINED Bilateral 12/26/2016    Procedure: COMBINED TONSILLECTOMY, ADENOIDECTOMY;  Surgeon: Olvin Raymond MD;  Location:  OR       Social History     Tobacco Use     Smoking status: Never Smoker     Smokeless tobacco: Never Used     Tobacco comment: non smoking household   Substance Use Topics     Alcohol use: No     Alcohol/week: 0.0 standard drinks     Family History   Problem Relation Age of Onset     Asthma Mother      Thyroid Disease Mother         resolved shortly after pregnancy     Other - See Comments Mother         Dx with Lupus on 11/2014     Hypertension Father      Diabetes Maternal Grandfather      Heart Disease Other         maternal great grandparents     Breast Cancer Other         maternal great aunt     Cerebrovascular Disease No family hx of      Glaucoma No family hx of      Macular Degeneration No family hx of            Breast Cancer Screening:        History of abnormal Pap smear: NO - age 21-29 PAP every 3 years recommended     Reviewed and updated as needed this visit by clinical staff   Tobacco  Allergies  Meds   Med Hx  Surg Hx  Fam Hx  Soc Hx          Reviewed and updated as needed this visit by Provider                   Past Medical History:   Diagnosis Date     Acanthosis nigricans      Amblyopia      Bipolar 2 disorder (H) 1/20/2016     Episodic mood disorder (H) 9/28/2015    9/15: Being seen by psychiatry at [facility].  On Abilify and something else for her anxiety (she cannot remember which medication.)     PTSD (post-traumatic stress disorder) 1/20/2016      Past Surgical History:   Procedure  "Laterality Date     PE TUBES       TONSILLECTOMY, ADENOIDECTOMY, COMBINED Bilateral 12/26/2016    Procedure: COMBINED TONSILLECTOMY, ADENOIDECTOMY;  Surgeon: Olvin Raymond MD;  Location:  OR       Review of Systems  CONSTITUTIONAL: NEGATIVE for fever, chills, change in weight  INTEGUMENTARU/SKIN: NEGATIVE for worrisome rashes, moles or lesions  EYES: NEGATIVE for vision changes or irritation  ENT: NEGATIVE for ear, mouth and throat problems  RESP: NEGATIVE for significant cough or SOB  BREAST: NEGATIVE for masses, tenderness or discharge  CV: NEGATIVE for chest pain, palpitations or peripheral edema  GI: NEGATIVE for nausea, abdominal pain, heartburn, or change in bowel habits  : NEGATIVE for unusual urinary or vaginal symptoms. Periods are regular.  MUSCULOSKELETAL: NEGATIVE for significant arthralgias or myalgia  NEURO: NEGATIVE for weakness, dizziness or paresthesias  PSYCHIATRIC: NEGATIVE for changes in mood or affect     OBJECTIVE:   /88 (BP Location: Right arm, Patient Position: Sitting, Cuff Size: Adult Regular)   Pulse 90   Ht 1.664 m (5' 5.5\")   Wt 65 kg (143 lb 3.2 oz)   SpO2 95%   BMI 23.47 kg/m    Physical Exam  GENERAL: healthy, alert and no distress  NECK: no adenopathy, no asymmetry, masses, or scars and thyroid normal to palpation  RESP: lungs clear to auscultation - no rales, rhonchi or wheezes  BREAST: Declined  CV: regular rate and rhythm, normal S1 S2, no S3 or S4, no murmur, click or rub, no peripheral edema and peripheral pulses strong  ABDOMEN: soft, nontender, no hepatosplenomegaly, no masses and bowel sounds normal   (female): normal female external genitalia, normal urethral meatus, vaginal mucosa pink, moist, well rugated, and normal cervix/adnexa/uterus without masses or discharge  MS: no gross musculoskeletal defects noted, no edema  SKIN: no suspicious lesions or rashes  NEURO: Normal strength and tone, mentation intact and speech normal  PSYCH: mentation appears " "normal, affect normal/bright    ASSESSMENT/PLAN:   (Z01.419) Encounter for gynecological examination without abnormal finding  (primary encounter diagnosis)  Comment: Gonorrhea/Chlamydia screening up to date  Plan: Pap imaged thin layer screen only - recommended        age 21 - 24 years        (Z23) Need for Tdap vaccination  Plan: TDAP VACCINE (Adacel, Boostrix)  [[id number]]    (Z11.59) Need for hepatitis C screening test  Plan: Hepatitis C antibody        (Z11.4) Screening for HIV (human immunodeficiency virus)  Plan: HIV Antigen Antibody Combo    (Z30.42) Surveillance for Depo-Provera contraception  Comment: Discussed continued use of Depo Provera and risk of bone loss with prolonged use. Will renew orders, but recommend considering short term break in the next few years with use of alternate contraception. Aware when next dose is due.  Plan: medroxyPROGESTERone (DEPO-PROVERA) injection         150 mg          COUNSELING:  Reviewed preventive health counseling, as reflected in patient instructions  Special attention given to:        Regular exercise       Healthy diet/nutrition       Immunizations    Vaccinated for: TDAP         Contraception       Consider Hep C screening for all patients one time for ages 18-79 years       HIV screeninx in teen years, 1x in adult years, and at intervals if high risk    Estimated body mass index is 23.47 kg/m  as calculated from the following:    Height as of this encounter: 1.664 m (5' 5.5\").    Weight as of this encounter: 65 kg (143 lb 3.2 oz).      She reports that she has never smoked. She has never used smokeless tobacco.      Counseling Resources:  ATP IV Guidelines  Pooled Cohorts Equation Calculator  Breast Cancer Risk Calculator  BRCA-Related Cancer Risk Assessment: FHS-7 Tool  FRAX Risk Assessment  ICSI Preventive Guidelines  Dietary Guidelines for Americans, 2010  USDA's MyPlate  ASA Prophylaxis  Lung CA Screening    CHEKO Schmitt [facility] " [facility]

## 2022-04-26 NOTE — PATIENT INSTRUCTIONS
If you have any questions regarding your visit, Please contact your care team.     [email or web address] Services: [phone]  To Schedule an Appointment 24/7  Call: [phone]. Josephs [facility] Services HOURS TELEPHONE NUMBER     Josie Gant-Medical Assistant    Ryann-RN  Chiquis-RN  Danelle Spears-  Ghazala-         Monday 7:30 am-5:00 pm    Tuesday 8:00 am-4:00 pm    Wednesday 7:30 am-4:00 pm  [place]    Thursday 8:00 am-11:00 am    Friday 7:30 am-4:00 pm [facility]  [address] [place] [address] [phone] ask for [facility]  [phone] Fax    Imaging Scheduling all locations  [phone]     [facility] Labor and Delivery  [address] [place] [address]   [place], [place] [id number] [phone]         Urgent Care locations:  [facility]   Monday-Friday  10 am - 8 pm  Saturday and Sunday   9 am - 5 pm     [phone]   If you need a medication refill, please contact your pharmacy. Please allow 3 business days for your refill to be completed.  As always, Thank you for trusting us with your healthcare needs!      see additional instructions from your care team below

## 2022-04-27 LAB
HCV AB SERPL QL IA: NONREACTIVE
HIV 1+2 AB+HIV1 P24 AG SERPL QL IA: NONREACTIVE

## 2022-04-28 LAB
BKR LAB AP GYN ADEQUACY: NORMAL
BKR LAB AP GYN INTERPRETATION: NORMAL
BKR LAB AP HPV REFLEX: NO
BKR LAB AP PREVIOUS ABNORMAL: NORMAL
PATH REPORT.COMMENTS IMP SPEC: NORMAL
PATH REPORT.COMMENTS IMP SPEC: NORMAL
PATH REPORT.RELEVANT HX SPEC: NORMAL

## 2022-05-31 ENCOUNTER — OFFICE VISIT (OUTPATIENT)
Dept: URGENT CARE | Facility: URGENT CARE | Age: 22
End: 2022-05-31
Payer: COMMERCIAL

## 2022-05-31 VITALS
WEIGHT: 139.2 LBS | TEMPERATURE: 98.1 F | HEART RATE: 99 BPM | SYSTOLIC BLOOD PRESSURE: 138 MMHG | BODY MASS INDEX: 22.81 KG/M2 | DIASTOLIC BLOOD PRESSURE: 88 MMHG | OXYGEN SATURATION: 99 %

## 2022-05-31 DIAGNOSIS — Z11.3 SCREEN FOR STD (SEXUALLY TRANSMITTED DISEASE): Primary | ICD-10-CM

## 2022-05-31 PROCEDURE — 99213 OFFICE O/P EST LOW 20 MIN: CPT | Performed by: PHYSICIAN ASSISTANT

## 2022-05-31 PROCEDURE — 87491 CHLMYD TRACH DNA AMP PROBE: CPT | Performed by: PHYSICIAN ASSISTANT

## 2022-05-31 PROCEDURE — 87591 N.GONORRHOEAE DNA AMP PROB: CPT | Performed by: PHYSICIAN ASSISTANT

## 2022-05-31 ASSESSMENT — ENCOUNTER SYMPTOMS
SORE THROAT: 0
CARDIOVASCULAR NEGATIVE: 1
SHORTNESS OF BREATH: 0
RESPIRATORY NEGATIVE: 1
FEVER: 0
MYALGIAS: 0
HEMATURIA: 0
DIZZINESS: 0
DIARRHEA: 0
WEAKNESS: 0
ENDOCRINE NEGATIVE: 1
GASTROINTESTINAL NEGATIVE: 1
DYSURIA: 0
RHINORRHEA: 0
NEUROLOGICAL NEGATIVE: 1
CONSTITUTIONAL NEGATIVE: 1
LIGHT-HEADEDNESS: 0
COUGH: 0
ACTIVITY CHANGE: 0
HEADACHES: 0
PALPITATIONS: 0
POLYDIPSIA: 0
FATIGUE: 0
VOMITING: 0
NECK STIFFNESS: 0
ABDOMINAL PAIN: 0
NECK PAIN: 0
FREQUENCY: 0
MUSCULOSKELETAL NEGATIVE: 1
NAUSEA: 0
FLANK PAIN: 0
CHILLS: 0
ADENOPATHY: 0

## 2022-06-01 LAB
C TRACH DNA SPEC QL PROBE+SIG AMP: NEGATIVE
N GONORRHOEA DNA SPEC QL NAA+PROBE: NEGATIVE

## 2022-06-14 ENCOUNTER — OFFICE VISIT (OUTPATIENT)
Dept: URGENT CARE | Facility: URGENT CARE | Age: 22
End: 2022-06-14
Payer: COMMERCIAL

## 2022-06-14 VITALS
HEART RATE: 101 BPM | RESPIRATION RATE: 18 BRPM | DIASTOLIC BLOOD PRESSURE: 99 MMHG | SYSTOLIC BLOOD PRESSURE: 161 MMHG | OXYGEN SATURATION: 99 % | BODY MASS INDEX: 22.94 KG/M2 | TEMPERATURE: 98.1 F | WEIGHT: 140 LBS

## 2022-06-14 DIAGNOSIS — Z11.3 SCREEN FOR STD (SEXUALLY TRANSMITTED DISEASE): Primary | ICD-10-CM

## 2022-06-14 LAB
ALBUMIN UR-MCNC: NEGATIVE MG/DL
AMORPH CRY #/AREA URNS HPF: ABNORMAL /HPF
APPEARANCE UR: ABNORMAL
BACTERIA #/AREA URNS HPF: ABNORMAL /HPF
BILIRUB UR QL STRIP: NEGATIVE
CLUE CELLS: ABNORMAL
COLOR UR AUTO: YELLOW
GLUCOSE UR STRIP-MCNC: NEGATIVE MG/DL
HGB UR QL STRIP: NEGATIVE
KETONES UR STRIP-MCNC: NEGATIVE MG/DL
LEUKOCYTE ESTERASE UR QL STRIP: NEGATIVE
NITRATE UR QL: NEGATIVE
PH UR STRIP: 6.5 [PH] (ref 5–7)
RBC #/AREA URNS AUTO: ABNORMAL /HPF
SP GR UR STRIP: 1.01 (ref 1–1.03)
SQUAMOUS #/AREA URNS AUTO: ABNORMAL /LPF
TRICHOMONAS, WET PREP: ABNORMAL
UROBILINOGEN UR STRIP-ACNC: 0.2 E.U./DL
WBC #/AREA URNS AUTO: ABNORMAL /HPF
WBC'S/HIGH POWER FIELD, WET PREP: ABNORMAL
YEAST, WET PREP: ABNORMAL

## 2022-06-14 PROCEDURE — 87210 SMEAR WET MOUNT SALINE/INK: CPT | Performed by: PHYSICIAN ASSISTANT

## 2022-06-14 PROCEDURE — 99213 OFFICE O/P EST LOW 20 MIN: CPT | Performed by: PHYSICIAN ASSISTANT

## 2022-06-14 PROCEDURE — 81001 URINALYSIS AUTO W/SCOPE: CPT | Performed by: PHYSICIAN ASSISTANT

## 2022-06-14 PROCEDURE — 87491 CHLMYD TRACH DNA AMP PROBE: CPT | Performed by: PHYSICIAN ASSISTANT

## 2022-06-14 PROCEDURE — 87591 N.GONORRHOEAE DNA AMP PROB: CPT | Performed by: PHYSICIAN ASSISTANT

## 2022-06-14 ASSESSMENT — ENCOUNTER SYMPTOMS
NECK STIFFNESS: 0
CONSTITUTIONAL NEGATIVE: 1
PALPITATIONS: 0
CARDIOVASCULAR NEGATIVE: 1
HEADACHES: 0
NECK PAIN: 0
ABDOMINAL PAIN: 0
FLANK PAIN: 0
VOMITING: 0
GASTROINTESTINAL NEGATIVE: 1
ENDOCRINE NEGATIVE: 1
ADENOPATHY: 0
NAUSEA: 0
ACTIVITY CHANGE: 0
FREQUENCY: 1
RHINORRHEA: 0
DIARRHEA: 0
FATIGUE: 0
MYALGIAS: 0
FEVER: 0
POLYDIPSIA: 0
MUSCULOSKELETAL NEGATIVE: 1
CHILLS: 0
SHORTNESS OF BREATH: 0
NEUROLOGICAL NEGATIVE: 1
WEAKNESS: 0
RESPIRATORY NEGATIVE: 1
HEMATURIA: 0
LIGHT-HEADEDNESS: 0
COUGH: 0
DIZZINESS: 0
DYSURIA: 1
SORE THROAT: 0

## 2022-06-14 NOTE — PROGRESS NOTES
Chief Complaint:    Chief Complaint   Patient presents with     STD     Requesting STI testing   Partner is having sx but has not been tested yet        ASSESSMENT     1. Screen for STD (sexually transmitted disease)           PLAN    Urinalysis discussed with patient  Wet prep was negative  We will call with STD results when available.  Follow up with PCP in 2-3 days if symptoms are not improving.  Worrisome symptoms discussed with instructions to go to the ED.  Patient verbalized understanding and agreed with this plan.    Labs:     Results for orders placed or performed in visit on 06/14/22   UA reflex to Microscopic and Culture     Status: Abnormal    Specimen: Urine, Midstream   Result Value Ref Range    Color Urine Yellow Colorless, Straw, Light Yellow, Yellow    Appearance Urine Cloudy (A) Clear    Glucose Urine Negative Negative mg/dL    Bilirubin Urine Negative Negative    Ketones Urine Negative Negative mg/dL    Specific Gravity Urine 1.010 1.003 - 1.035    Blood Urine Negative Negative    pH Urine 6.5 5.0 - 7.0    Protein Albumin Urine Negative Negative mg/dL    Urobilinogen Urine 0.2 0.2, 1.0 E.U./dL    Nitrite Urine Negative Negative    Leukocyte Esterase Urine Negative Negative   Urine Microscopic     Status: Abnormal   Result Value Ref Range    Bacteria Urine None Seen None Seen /HPF    RBC Urine 0-2 0-2 /HPF /HPF    WBC Urine 0-5 0-5 /HPF /HPF    Squamous Epithelials Urine Few (A) None Seen /LPF    Amorphous Crystals Urine Few (A) None Seen /HPF    Narrative    Urine Culture not indicated   Wet preparation     Status: Abnormal    Specimen: Vagina; Swab   Result Value Ref Range    Trichomonas Absent Absent    Yeast Absent Absent    Clue Cells Absent Absent    WBCs/high power field 1+ (A) None       Problem history    Patient Active Problem List   Diagnosis     Vitamin D deficiency     Moderate recurrent major depression (H)     Anxiety     Panic attack     Contraception     Panic disorder without  agoraphobia     Episodic mood disorder (H)     Bipolar 2 disorder (H)     PTSD (post-traumatic stress disorder)     Intractable migraine without aura and with status migrainosus     Myofascial pain     Temporomandibular joint disorder     Tension-type headache     Xerostomia     Elevated blood-pressure reading without diagnosis of hypertension     Chronic bilateral low back pain with bilateral sciatica       Current Meds    Current Outpatient Medications:      amphetamine-dextroamphetamine (ADDERALL XR) 20 MG 24 hr capsule, TAKE 2 CAPSULES BY MOUTH DAILY, Disp: , Rfl:      DULoxetine (CYMBALTA) 30 MG capsule, TAKE 1 CAPSULE BY MOUTH ONCE DAILY IN ADDITION TO THE 60MG CAPSULE AT BEDTIME FOR A TOTAL DOSE OF 90MG DAILY., Disp: , Rfl: 2     DULoxetine (CYMBALTA) 60 MG capsule, TAKE 1 CAPSULE BY MOUTH ONCE DAILY, Disp: , Rfl: 3     LamoTRIgine (LAMICTAL) 250 MG ER tablet, Take 250 mg by mouth At Bedtime, Disp: , Rfl:     Current Facility-Administered Medications:      medroxyPROGESTERone (DEPO-PROVERA) injection 150 mg, 150 mg, Intramuscular, Q90 Days, Josie Angeles APRN CNP    Allergies  Allergies   Allergen Reactions     Amoxicillin      bubblegum flavor makes her ill     Augmentin Hives     Other reaction(s): Vomiting       SUBJECTIVE    HPI:  Alfreda Hernandez is a 21 year old female who presents for STD testing.  She is currently asymptomatic but reports her partner has discharge from the penis.  she denies dysuria, urgency, frequency, back pain, nausea, vomiting, fever and chills, flank pain, vaginal discharge, and vaginal odor.    ROS:      Review of Systems   Constitutional: Negative.  Negative for activity change, chills, fatigue and fever.   HENT: Negative for congestion, ear pain, rhinorrhea and sore throat.    Respiratory: Negative.  Negative for cough and shortness of breath.    Cardiovascular: Negative.  Negative for chest pain and palpitations.   Gastrointestinal: Negative.  Negative for abdominal  pain, diarrhea, nausea and vomiting.   Endocrine: Negative.  Negative for polydipsia and polyuria.   Genitourinary: Positive for dysuria, frequency and urgency. Negative for flank pain, hematuria, pelvic pain, vaginal discharge and vaginal pain.   Musculoskeletal: Negative.  Negative for myalgias, neck pain and neck stiffness.   Allergic/Immunologic: Negative for immunocompromised state.   Neurological: Negative.  Negative for dizziness, weakness, light-headedness and headaches.   Hematological: Negative for adenopathy.       Family History   Family History   Problem Relation Age of Onset     Asthma Mother      Thyroid Disease Mother         resolved shortly after pregnancy     Other - See Comments Mother         Dx with Lupus on 11/2014     Hypertension Father      Diabetes Maternal Grandfather      Heart Disease Other         maternal great grandparents     Breast Cancer Other         maternal great aunt     Cerebrovascular Disease No family hx of      Glaucoma No family hx of      Macular Degeneration No family hx of         Social History  Social History     Socioeconomic History     Marital status: Single     Spouse name: Not on file     Number of children: Not on file     Years of education: Not on file     Highest education level: Not on file   Occupational History     Not on file   Tobacco Use     Smoking status: Never Smoker     Smokeless tobacco: Never Used     Tobacco comment: non smoking household   Substance and Sexual Activity     Alcohol use: No     Alcohol/week: 0.0 standard drinks     Drug use: No     Sexual activity: Yes     Partners: Male     Birth control/protection: Injection   Other Topics Concern     Not on file   Social History Narrative     Not on file     Social Determinants of Health     Financial Resource Strain: Not on file   Food Insecurity: Not on file   Transportation Needs: Not on file   Physical Activity: Not on file   Stress: Not on file   Social Connections: Not on file   Intimate  Partner Violence: Not on file   Housing Stability: Not on file           OBJECTIVE     Vital signs noted and reviewed by Doug Cuellar PA-C  BP (!) 161/99   Pulse 101   Temp 98.1  F (36.7  C) (Tympanic)   Resp 18   Wt 63.5 kg (140 lb)   SpO2 99%   BMI 22.94 kg/m       Physical Exam  Vitals and nursing note reviewed.   Constitutional:       General: She is not in acute distress.     Appearance: Normal appearance. She is well-developed. She is not ill-appearing, toxic-appearing or diaphoretic.   HENT:      Head: Normocephalic and atraumatic.      Right Ear: Tympanic membrane and external ear normal.      Left Ear: Tympanic membrane and external ear normal.   Eyes:      Pupils: Pupils are equal, round, and reactive to light.   Cardiovascular:      Rate and Rhythm: Normal rate and regular rhythm.      Heart sounds: Normal heart sounds. No murmur heard.    No friction rub. No gallop.   Pulmonary:      Effort: Pulmonary effort is normal. No respiratory distress.      Breath sounds: Normal breath sounds. No wheezing or rales.   Chest:      Chest wall: No tenderness.   Abdominal:      General: Bowel sounds are normal. There is no distension.      Palpations: Abdomen is soft. Abdomen is not rigid. There is no mass.      Tenderness: There is no abdominal tenderness. There is no guarding or rebound. Negative signs include Parks's sign and McBurney's sign.   Musculoskeletal:      Cervical back: Normal range of motion and neck supple.   Lymphadenopathy:      Cervical: No cervical adenopathy.   Skin:     General: Skin is warm and dry.   Neurological:      Mental Status: She is alert and oriented to person, place, and time.      Cranial Nerves: No cranial nerve deficit.      Deep Tendon Reflexes: Reflexes are normal and symmetric.   Psychiatric:         Behavior: Behavior normal. Behavior is cooperative.         Thought Content: Thought content normal.         Judgment: Judgment normal.             Doug Cuellar PA-C   6/14/2022, 12:53 PM

## 2022-06-15 LAB
C TRACH DNA SPEC QL NAA+PROBE: NEGATIVE
N GONORRHOEA DNA SPEC QL NAA+PROBE: NEGATIVE

## 2022-08-30 ENCOUNTER — OFFICE VISIT (OUTPATIENT)
Dept: OBGYN | Facility: CLINIC | Age: 22
End: 2022-08-30
Payer: COMMERCIAL

## 2022-08-30 VITALS
OXYGEN SATURATION: 96 % | HEIGHT: 66 IN | DIASTOLIC BLOOD PRESSURE: 74 MMHG | SYSTOLIC BLOOD PRESSURE: 123 MMHG | HEART RATE: 94 BPM | WEIGHT: 145.4 LBS | BODY MASS INDEX: 23.37 KG/M2

## 2022-08-30 DIAGNOSIS — Z30.42 SURVEILLANCE FOR DEPO-PROVERA CONTRACEPTION: Primary | ICD-10-CM

## 2022-08-30 LAB — HCG UR QL: NEGATIVE

## 2022-08-30 PROCEDURE — 81025 URINE PREGNANCY TEST: CPT | Performed by: NURSE PRACTITIONER

## 2022-08-30 PROCEDURE — 96372 THER/PROPH/DIAG INJ SC/IM: CPT | Performed by: NURSE PRACTITIONER

## 2022-08-30 PROCEDURE — 99213 OFFICE O/P EST LOW 20 MIN: CPT | Mod: 25 | Performed by: NURSE PRACTITIONER

## 2022-08-30 RX ORDER — MEDROXYPROGESTERONE ACETATE 150 MG/ML
150 INJECTION, SUSPENSION INTRAMUSCULAR
Status: COMPLETED | OUTPATIENT
Start: 2022-08-30 | End: 2022-11-08

## 2022-08-30 RX ADMIN — MEDROXYPROGESTERONE ACETATE 150 MG: 150 INJECTION, SUSPENSION INTRAMUSCULAR at 14:23

## 2022-08-30 ASSESSMENT — PAIN SCALES - GENERAL: PAINLEVEL: NO PAIN (0)

## 2022-08-30 NOTE — PATIENT INSTRUCTIONS
If you have any questions regarding your visit, Please contact your care team.     [facility] Services: [phone]  To Schedule an Appointment 24/7  Call: [phone] Health [facility] HOURS TELEPHONE NUMBER     Josie Angeles- APRN CNP      Gregg Garzon-ARELI Sim-ARELI Spears-Surgery Scheduler  Ghazala-Surgery Scheduler         Monday 7:30 am-5:00 pm    Tuesday 8:00 am-4:00 pm    Wednesday 7:30 am-4:00 pm  [place]    Thursday 8:00 am-11:00 am    Friday 7:30 am-4:00 pm [address] [place] [address] [phone] ask for [facility]  [phone] Fax    Imaging Scheduling all locations  [phone]     [facility] Labor and Delivery  [address] [place] [address] Dr.  [place], [place] [id number] [phone]         Urgent Care locations:  [facility]   Monday-Friday  10 am - 8 pm  Saturday and Sunday   9 am - 5 pm     [phone]   If you need a medication refill, please contact your pharmacy. Please allow 3 business days for your refill to be completed.  As always, Thank you for trusting us with your healthcare needs!      see additional instructions from your care team below  a

## 2022-08-30 NOTE — PROGRESS NOTES
"  Assessment & Plan     Surveillance for Depo-Provera contraception  UPT negative today and desires to continue on Depo Provera. New order entered and injection given today. Patient given card with dates her next injection is due. Encouraged to follow up on time. At her next AFE, consider short term trial off Depo Provera and can do alternate contraception in the meantime. Patient is given an opportunity to ask questions and have them answered.  - HCG qualitative urine  - medroxyPROGESTERone (DEPO-PROVERA) injection 150 mg    CHEKO Schmitt CNP [facility]   Alfreda is a 21 year old, presenting for the following health issues:  Depo Provera      HPI     Depo Provera    Patient has been on Depo Provera for several years. She was due for an injection by 7/26/22 and missed her window. We have discussed consideration of a short term break from the Depo Provera in the past. Overall no adverse side effects with the Depo Provera, no breakthrough bleeding. Desires to continue for now with consideration to stopping it for a bit at her next annual exam. Date of last sexual activity verified.    Review of Systems   Constitutional, HEENT, cardiovascular, pulmonary, gi and gu systems are negative, except as otherwise noted.      Objective    /74 (BP Location: Right arm, Patient Position: Sitting, Cuff Size: Adult Regular)   Pulse 94   Ht 1.664 m (5' 5.5\")   Wt 66 kg (145 lb 6.4 oz)   SpO2 96%   BMI 23.83 kg/m    Body mass index is 23.83 kg/m .  Physical Exam   GENERAL: healthy, alert and no distress  MS: no gross musculoskeletal defects noted, no edema  SKIN: no suspicious lesions or rashes  PSYCH: mentation appears normal, affect normal/bright    Results for orders placed or performed in visit on 08/30/22 (from the past 24 hour(s))   HCG qualitative urine   Result Value Ref Range    hCG Urine Qualitative Negative Negative     "

## 2022-08-30 NOTE — PROGRESS NOTES
Clinic Administered Medication Documentation    Administrations This Visit     medroxyPROGESTERone (DEPO-PROVERA) injection 150 mg     Admin Date  08/30/2022 Action  Given Dose  150 mg Route  Intramuscular Site  Right Gluteus Nura Administered By  Alfreda Lugo CMA    Ordering Provider: Josie Angeles APRN CNP    NDC: 49402-368-02    Lot#: [id number]    : [facility]    Patient Supplied?: No                  Depo Provera Documentation    URINE HCG: negative    Depo-Provera Standing Order inclusion/exclusion criteria reviewed.   Patient meets: inclusion criteria     BP: 123/74  LAST PAP/EXAM: No results found for: PAP    Prior to injection, verified patient identity using patient's name and date of birth. Medication was administered. Please see MAR and medication order for additional information.     Was entire vial of medication used? Yes  Vial/Syringe: Single dose vial  Expiration Date:  09/2023    Patient instructed to remain in clinic for 15 minutes and report any adverse reaction to staff immediately .  NEXT INJECTION DUE: 11/15/22 - 11/29/22

## 2022-11-07 NOTE — PROGRESS NOTES
Assessment & Plan     Breakthrough bleeding on Depo-Provera  We discussed her breakthrough bleeding and possible etiologies. Patient would like to get her next injection early today and plan for change in contraception when her next injection is due. Will give injection today, advised if bleeding continues then she needs to notify me and we will consider if pelvic ultrasound is appropriate.     Counseling for birth control regarding intrauterine device (IUD)  Patient would like to discuss use of IUD and we discussed insertion, removal, possible side effects, menstrual changes. Reviewed risk of uterine perforation with insertion and discussed possible need for surgical removal. Patient advised to take 600 mg Ibuprofen prior to insertion. Discussed different types of IUD. Scheduled for insertion of Mirena IUD during the window her next injection is due. Patient is given an opportunity to ask questions and have them answered.    CHEKO Schmitt [facility]   Alfreda is a 22 year old, presenting for the following health issues:  Breakthrough bleeding      HPI     Breakthrough bleeding with Depo Provera    Patient has been on Depo Provera for the last few years. Plan was to discontinue sometime next year to take a break. Patient was late for her last dose of Depo Provera and received it in August. Due starting next week for her next injection. For about 2 weeks, has been having light bleeding, stop for a day or two, then repeats. No heavy flow, clots. Occasional cramping. Denies fatigue, dizziness. No prior issues with breakthrough bleeding. Considering having an IUD inserted soon for long term contraception and to then help get off the Depo Provera.  Denies vaginal itching, discharge, odor, pelvic pain, urinary symptoms. No STI concerns.    Review of Systems   Constitutional, HEENT, cardiovascular, pulmonary, gi and gu systems are negative, except as otherwise noted.     "  Objective    BP (!) 142/86 (BP Location: Right arm, Patient Position: Sitting, Cuff Size: Adult Regular)   Pulse 103   Ht 1.664 m (5' 5.5\")   Wt 69.6 kg (153 lb 6.4 oz)   SpO2 95%   BMI 25.14 kg/m    Physical Exam   GENERAL: healthy, alert and no distress  MS: no gross musculoskeletal defects noted, no edema  SKIN: no suspicious lesions or rashes  PSYCH: mentation appears normal, affect normal/bright    "

## 2022-11-08 ENCOUNTER — OFFICE VISIT (OUTPATIENT)
Dept: OBGYN | Facility: CLINIC | Age: 22
End: 2022-11-08
Payer: COMMERCIAL

## 2022-11-08 VITALS
SYSTOLIC BLOOD PRESSURE: 142 MMHG | HEIGHT: 66 IN | HEART RATE: 103 BPM | WEIGHT: 153.4 LBS | OXYGEN SATURATION: 95 % | BODY MASS INDEX: 24.65 KG/M2 | DIASTOLIC BLOOD PRESSURE: 86 MMHG

## 2022-11-08 DIAGNOSIS — Z30.09 COUNSELING FOR BIRTH CONTROL REGARDING INTRAUTERINE DEVICE (IUD): ICD-10-CM

## 2022-11-08 DIAGNOSIS — N92.1 BREAKTHROUGH BLEEDING ON DEPO-PROVERA: Primary | ICD-10-CM

## 2022-11-08 PROCEDURE — 96372 THER/PROPH/DIAG INJ SC/IM: CPT | Performed by: NURSE PRACTITIONER

## 2022-11-08 PROCEDURE — 99213 OFFICE O/P EST LOW 20 MIN: CPT | Mod: 25 | Performed by: NURSE PRACTITIONER

## 2022-11-08 RX ADMIN — MEDROXYPROGESTERONE ACETATE 150 MG: 150 INJECTION, SUSPENSION INTRAMUSCULAR at 11:22

## 2022-11-08 NOTE — PROGRESS NOTES
Clinic Administered Medication Documentation    Administrations This Visit     medroxyPROGESTERone (DEPO-PROVERA) injection 150 mg     Admin Date  11/08/2022 Action  Given Dose  150 mg Route  Intramuscular Site  Right Gluteus Nura Administered By  Alfreda Lugo CMA    Ordering Provider: Josie Angeles APRN CNP    Patient Supplied?: No                  Depo Provera Documentation    URINE HCG: not indicated    Depo-Provera Standing Order inclusion/exclusion criteria reviewed.   Patient meets: inclusion criteria     BP: 142/86  LAST PAP/EXAM: No results found for: PAP    Prior to injection, verified patient identity using patient's name and date of birth. Medication was administered. Please see MAR and medication order for additional information.     Was entire vial of medication used? Yes  Vial/Syringe: Single dose vial  Expiration Date:  09/2023    Patient instructed to remain in clinic for 15 minutes and report any adverse reaction to staff immediately .  NEXT INJECTION DUE: 1/24/23 - 2/7/23

## 2022-11-08 NOTE — PATIENT INSTRUCTIONS
If you have any questions regarding your visit, Please contact your care team.     [facility]"[employer]" Services: [phone]  To Schedule an Appointment 24/7  Call: [phone] HOURS TELEPHONE NUMBER     Josie Angeles- APRN CNP      Gregg De Souza-Surgery Scheduler  [facility]ala-Surgery Scheduler         Monday 7:30 am-5:00 pm    Tuesday 8:00 am-4:00 pm    Wednesday 7:30 am-4:00 pm  [place]    Thursday 8:00 am-11:00 am    Friday 7:30 am-4:00 pm [address] [place] [address] [phone] ask for [facility]  [phone] Fax    Imaging Scheduling all locations  [phone]     [facility] Labor and Delivery  [address] [place] [address]   [place], [place] [id number] [phone]         Urgent Care locations:  [facility]   Monday-Friday  10 am - 8 pm  Saturday and Sunday   9 am - 5 pm     [phone]   If you need a medication refill, please contact your pharmacy. Please allow 3 business days for your refill to be completed.  As always, Thank you for trusting us with your healthcare needs!      see additional instructions from your care team below

## 2022-11-16 ENCOUNTER — E-VISIT (OUTPATIENT)
Dept: URGENT CARE | Facility: CLINIC | Age: 22
End: 2022-11-16
Payer: COMMERCIAL

## 2022-11-16 DIAGNOSIS — N89.8 VAGINAL DISCHARGE: Primary | ICD-10-CM

## 2022-11-16 PROCEDURE — 99207 PR NON-BILLABLE SERV PER CHARTING: CPT | Performed by: EMERGENCY MEDICINE

## 2022-11-16 NOTE — PATIENT INSTRUCTIONS
Dear Alfreda Hernandez,    We are sorry you are not feeling well. Based on the responses you provided, it is recommended that you be seen in-person in urgent care so we can better evaluate your symptoms. Please click here to find the nearest urgent care location to you.   You will not be charged for this Visit. Thank you for trusting us with your care.    Doug Denney MD

## 2022-11-21 ENCOUNTER — HEALTH MAINTENANCE LETTER (OUTPATIENT)
Age: 22
End: 2022-11-21

## 2022-11-27 ENCOUNTER — MYC MEDICAL ADVICE (OUTPATIENT)
Dept: OBGYN | Facility: CLINIC | Age: 22
End: 2022-11-27

## 2022-11-27 DIAGNOSIS — N92.1 BREAKTHROUGH BLEEDING: Primary | ICD-10-CM

## 2022-11-28 NOTE — TELEPHONE ENCOUNTER
"Per 11/8/22 OV plan:  \"Breakthrough bleeding on Depo-Provera  We discussed her breakthrough bleeding and possible etiologies. Patient would like to get her next injection early today and plan for change in contraception when her next injection is due. Will give injection today, advised if bleeding continues then she needs to notify me and we will consider if pelvic ultrasound is appropriate.\"    Pt is writing in stating that her bleeding stopped for a week after her early Depo injection but since after that week she has been bleeding off and on, same as she experienced before.    Routing to CHEKO Self CNP, to see if she would like to place a pelvic US for pt.    Chiquis Cheema RN    "

## 2023-01-11 ENCOUNTER — OFFICE VISIT (OUTPATIENT)
Dept: URGENT CARE | Facility: URGENT CARE | Age: 23
End: 2023-01-11
Payer: COMMERCIAL

## 2023-01-11 VITALS
SYSTOLIC BLOOD PRESSURE: 147 MMHG | RESPIRATION RATE: 16 BRPM | HEART RATE: 85 BPM | OXYGEN SATURATION: 99 % | TEMPERATURE: 97.9 F | DIASTOLIC BLOOD PRESSURE: 89 MMHG

## 2023-01-11 DIAGNOSIS — N89.8 VAGINAL DISCHARGE: ICD-10-CM

## 2023-01-11 DIAGNOSIS — N76.0 BACTERIAL VAGINOSIS: Primary | ICD-10-CM

## 2023-01-11 DIAGNOSIS — B96.89 BACTERIAL VAGINOSIS: Primary | ICD-10-CM

## 2023-01-11 LAB
ALBUMIN UR-MCNC: NEGATIVE MG/DL
APPEARANCE UR: CLEAR
BILIRUB UR QL STRIP: NEGATIVE
CLUE CELLS: PRESENT
COLOR UR AUTO: YELLOW
GLUCOSE UR STRIP-MCNC: NEGATIVE MG/DL
HGB UR QL STRIP: NEGATIVE
KETONES UR STRIP-MCNC: NEGATIVE MG/DL
LEUKOCYTE ESTERASE UR QL STRIP: NEGATIVE
NITRATE UR QL: NEGATIVE
PH UR STRIP: 7.5 [PH] (ref 5–7)
SP GR UR STRIP: 1.01 (ref 1–1.03)
TRICHOMONAS, WET PREP: ABNORMAL
UROBILINOGEN UR STRIP-ACNC: 0.2 E.U./DL
WBC'S/HIGH POWER FIELD, WET PREP: ABNORMAL
YEAST, WET PREP: ABNORMAL

## 2023-01-11 PROCEDURE — 99213 OFFICE O/P EST LOW 20 MIN: CPT | Performed by: PHYSICIAN ASSISTANT

## 2023-01-11 PROCEDURE — 87210 SMEAR WET MOUNT SALINE/INK: CPT | Performed by: PHYSICIAN ASSISTANT

## 2023-01-11 PROCEDURE — 81003 URINALYSIS AUTO W/O SCOPE: CPT | Performed by: PHYSICIAN ASSISTANT

## 2023-01-11 RX ORDER — METRONIDAZOLE 500 MG/1
500 TABLET ORAL 2 TIMES DAILY
Qty: 14 TABLET | Refills: 0 | Status: SHIPPED | OUTPATIENT
Start: 2023-01-11 | End: 2023-01-18

## 2023-01-11 NOTE — PROGRESS NOTES
Assessment & Plan     Bacterial vaginosis  - metroNIDAZOLE (FLAGYL) 500 MG tablet; Take 1 tablet (500 mg) by mouth 2 times daily for 7 days    Vaginal discharge  - UA reflex to Microscopic and Culture; Future  - Wet preparation; Future  - UA reflex to Microscopic and Culture  - Wet preparation    UA normal, wet prep with clue cells. Treat bacterial vaginosis with metronidazole twice daily for 7 days.  If symptoms worsen or fail to improve, follow-up with PCP.  Patient has no concerns for STI at this time but consider testing for gonorrhea and chlamydia if symptoms do not resolve.    Return in about 1 week (around 1/18/2023) for visit with primary care provider if not improving.     Tiesha Thacker PA-C  [facility]    Subjective   Alfreda Hernandez is a 22 year old who presents for the following health issues     Patient presents with:  Urgent Care  Vaginal Problem: Per patient states she has been having vaginal odor for a couple of weeks and wants to be checked for BV      HPI    Alfreda presents to clinic today for evaluation of foul-smelling vaginal discharge.  Symptoms first began couple weeks ago.  She was treated for bacterial vaginosis but notes that she has never had this before in the past.  She has not had any urinary frequency or dysuria.  No vaginal pain or abdominal pain. No fevers. Patient states she is not pregnant.     Review of Systems   ROS negative except as stated above.      Objective    BP (!) 147/89   Pulse 85   Temp 97.9  F (36.6  C) (Tympanic)   Resp 16   SpO2 99%   Physical Exam   GENERAL: healthy, alert and no distress  RESP: lungs clear to auscultation - no rales, rhonchi or wheezes  CV: regular rate and rhythm, normal S1 S2, no S3 or S4, no murmur, click or rub, no peripheral edema and peripheral pulses strong  ABDOMEN: soft, nontender  BACK: no CVA tenderness, no paralumbar tenderness    Results for orders placed or performed in visit on 01/11/23   UA reflex  to Microscopic and Culture     Status: Abnormal    Specimen: Urine, Clean Catch   Result Value Ref Range    Color Urine Yellow Colorless, Straw, Light Yellow, Yellow    Appearance Urine Clear Clear    Glucose Urine Negative Negative mg/dL    Bilirubin Urine Negative Negative    Ketones Urine Negative Negative mg/dL    Specific Gravity Urine 1.015 1.003 - 1.035    Blood Urine Negative Negative    pH Urine 7.5 (H) 5.0 - 7.0    Protein Albumin Urine Negative Negative mg/dL    Urobilinogen Urine 0.2 0.2, 1.0 E.U./dL    Nitrite Urine Negative Negative    Leukocyte Esterase Urine Negative Negative    Narrative    Microscopic not indicated   Wet preparation     Status: Abnormal    Specimen: Vagina; Swab   Result Value Ref Range    Trichomonas Absent Absent    Yeast Absent Absent    Clue Cells Present (A) Absent    WBCs/high power field 2+ (A) None

## 2023-01-24 ENCOUNTER — OFFICE VISIT (OUTPATIENT)
Dept: OBGYN | Facility: CLINIC | Age: 23
End: 2023-01-24
Payer: COMMERCIAL

## 2023-01-24 VITALS
WEIGHT: 151.8 LBS | BODY MASS INDEX: 24.4 KG/M2 | OXYGEN SATURATION: 96 % | DIASTOLIC BLOOD PRESSURE: 81 MMHG | HEART RATE: 88 BPM | SYSTOLIC BLOOD PRESSURE: 138 MMHG | HEIGHT: 66 IN

## 2023-01-24 DIAGNOSIS — Z30.430 ENCOUNTER FOR INSERTION OF INTRAUTERINE CONTRACEPTIVE DEVICE: Primary | ICD-10-CM

## 2023-01-24 PROCEDURE — 58300 INSERT INTRAUTERINE DEVICE: CPT | Performed by: NURSE PRACTITIONER

## 2023-01-24 NOTE — PATIENT INSTRUCTIONS
If you have any questions regarding your visit, Please contact your care team.     [facility] Services: [phone]  To Schedule an Appointment 24/7  Call: [phone] HOURS TELEPHONE NUMBER     Josie Angeles- APRN CNP      Gregg De Souza-Surgery Scheduler  [facility]-Surgery Scheduler         Monday 7:30 am-5:00 pm    Tuesday 8:00 am-4:00 pm    Wednesday 7:30 am-4:00 pm  [place]    Thursday 8:00 am-11:00 am    Friday 7:30 am-4:00 pm [address] [place] [address] [phone] ask for [facility]  [phone] Fax    Imaging Scheduling all locations  [phone]    [facility] Labor and Delivery  [address] [place] [address]   [place], [place] [id number] [phone]         Urgent Care locations:  [facility]   Monday-Friday  10 am - 8 pm  Saturday and Sunday   9 am - 5 pm     [phone]   If you need a medication refill, please contact your pharmacy. Please allow 3 business days for your refill to be completed.  As always, Thank you for trusting us with your healthcare needs!      see additional instructions from your care team below

## 2023-01-24 NOTE — PROGRESS NOTES
Alfreda Hernandez is a 22 year old  who presents today requesting placement of a Mirena IUD. Currently on Depo Provera and in her window for injection. We had discussed a trial off Depo Provera due to long term use and she has been having breakthrough bleeding recently. Did not complete the pelvic ultrasound that was ordered for her as the bleeding did resolve.    The patient meets and is agreeable to the following conditions:  She is not interested in conception in the near future.   She currently is in a stable, monogamous relationship.   There is no previous history of pelvic inflammatory disease.   There is no previous history of ectopic pregnancy.   She is willing to check monthly for the IUD string.   There is no history of unresolved abnormal uterine bleeding.   There is no history of an unresolved abnormal PAP smear.   She has no history of Jalil's disease or an allergy to copper (for Paraguard).   She has had a PAP smear within the ACOG screening guideline.   She denies the possibility of pregnancy.     We discussed risks, benefits, and alternatives including but not limited to:   Possibility of pregnancy and ectopic pregnancy.  Possibility of pelvic inflammatory disease, particularly with new partners.  Risk of uterine perforation or IUD expulsion.  Possibility of difficult removal.  Spotting or heavy bleeding.  Cramping, pain or infection during or after insertion.    The patient was given patient information on the IUD and the patient education brochure from the .  The patient has given consent to proceed with placement of the IUD.  She wishes to proceed.  All questions answered.    PROCEDURE:    Type of IUD: Mirena    The patient has taken 600 mg of Ibuprofen prior to the procedure. She is placed in a dorsal lithotomy position and a pelvic exam is performed to determine the position of the uterus.  The cervix is identified and cleaned with betadine. A single tooth tenaculum is applied to  the anterior lip of the cervix for stabilization. The uterus sounded to 7.0 cm. (Target sound depth is 6.5 cm to 8.5 cm.) The IUD insertion tube is prepared to manufacturers recommendations and inserted into the uterus under sterile conditions in the usual fashion. The IUD string is then cut to 3.0 cm.    The patient tolerated this procedure without immediate complication.  The patient is to return or call immediately for any unexplained fever, abdominal or pelvic pain, excessive bleeding, possibility of pregnancy, foul-smelling discharge, sense that the IUD has been expelled.  All questions were answered.    Return to clinic in 1 month for IUD check    Josie STILL CNP

## 2023-04-17 NOTE — PROGRESS NOTES
BP: 131/87    LAST PAP/EXAM: No results found for: PAP  URINE HCG:not indicated    The following medication was given:     MEDICATION: Depo Provera 150mg  ROUTE: IM  SITE: Ventrogluteal - Right  : [employer]  LOT #: [id number]  EXP:3/2020  NEXT INJECTION DUE: 5/2/18 - 5/16/18   Provider: VELIA Ott MA   no

## 2023-05-15 ENCOUNTER — ANCILLARY PROCEDURE (OUTPATIENT)
Dept: ULTRASOUND IMAGING | Facility: CLINIC | Age: 23
End: 2023-05-15
Attending: NURSE PRACTITIONER
Payer: COMMERCIAL

## 2023-05-15 DIAGNOSIS — N92.1 BREAKTHROUGH BLEEDING: ICD-10-CM

## 2023-05-15 PROCEDURE — 76830 TRANSVAGINAL US NON-OB: CPT | Mod: TC | Performed by: RADIOLOGY

## 2023-05-15 PROCEDURE — 76856 US EXAM PELVIC COMPLETE: CPT | Mod: TC | Performed by: RADIOLOGY

## 2023-05-17 ENCOUNTER — TELEPHONE (OUTPATIENT)
Dept: OBGYN | Facility: CLINIC | Age: 23
End: 2023-05-17
Payer: COMMERCIAL

## 2023-05-17 NOTE — LETTER
June 2, 2023      Alfreda Hernandez  [address]  [place] [address]        Dear ,    I have been unable to reach you to review your ultrasound results. The IUD is appropriately positioned. Near the top of the uterus is an area that may be a small uterine polyp or blood clot. Please schedule a follow up appointment if you would like to discuss/evaluate this further.    If you have any questions or concerns, please call the clinic at the number listed above.       Sincerely,      Josie STILL CNP

## 2023-05-23 NOTE — TELEPHONE ENCOUNTER
No response from patient. Result note sent requesting she call to discuss results. Will close this encounter. Josie STILL CNP

## 2023-05-25 NOTE — TELEPHONE ENCOUNTER
[facility] Call Center    Phone Message    May a detailed message be left on voicemail: yes     Reason for Call: Other:     Pt returned a call from Josie Gomez and is requesting a call back.    Action Taken: Message routed to:  [facility] p [phone]    Travel Screening: Not Applicable

## 2023-06-02 ENCOUNTER — HEALTH MAINTENANCE LETTER (OUTPATIENT)
Age: 23
End: 2023-06-02

## 2023-06-02 NOTE — TELEPHONE ENCOUNTER
Multiple attempts to follow up with patient.  Letter typed up and sent via [employer] to patient. Josie STILL CNP

## 2023-06-07 ENCOUNTER — MYC MEDICAL ADVICE (OUTPATIENT)
Dept: OBGYN | Facility: CLINIC | Age: 23
End: 2023-06-07
Payer: COMMERCIAL

## 2023-06-19 NOTE — PROGRESS NOTES
Assessment & Plan     Breakthrough bleeding with IUD  During visit, we discussed that if any lab showed infection, would treat as that can cause breakthrough bleeding. If no infection present, recommend she start PO estradiol to stabilize uterine lining. Discussed possible side effects, to expect a withdrawal bleed after finishing. If she continued to have bleeding, then she would likely want the IUD removed. Also, if we treat an infection and bleeding persists, then would send prescription for Estradiol.  Will plan follow up ultrasound as well to recheck the focal abnormality noted on prior ultrasound. Patient is given an opportunity to ask questions and have them answered.  - Wet preparation  - Chlamydia trachomatis PCR  - Neisseria gonorrhoeae PCR  - US Pelvic Transabdominal and Transvaginal; Future    Trichomonas infection  Prescription sent for treatment of infection, partner to be treated, avoid intercourse until treatment completed and bleeding resolves. Recommend repeat testing in 4-8 weeks.   - metroNIDAZOLE (FLAGYL) 500 MG tablet; Take 1 tablet (500 mg) by mouth 2 times daily for 7 days    CHEKO Schmitt [facility]   Alfreda is a 22 year old, presenting for the following health issues:  Breakthrough bleeding    HPI     Breakthrough bleeding    Patient had been on Depo Provera for a few years. Seen last November due to breakthrough bleeding. At that time, we discussed options and patient elected to receive her injection early. Bleeding did resolve. Seen in January fo riUD insertion as we had discussed it at her prior visit and she decided to try that. Mirena inserted and patient states she has been bleeding ever since. Breakthrough bleeding is worse with the IUD than Depo Provera. Mostly daily with varying cramping and some clots. Flow varies from spotting to light flow. Bleeding increases briefly the day after intercourse. Denies abnormal urinary or  "vaginal symptoms, no STI concerns, but amenable to screening.  Unsure if she wants to keep IUD.  She did complete a pelvic ultrasound about a month ago:     HISTORY: Breakthrough bleeding.     COMPARISON: None.     TECHNIQUE: Transabdominal scans were performed. Endovaginal ultrasound  was performed to better visualize the adnexa.     FINDINGS:  UTERUS: 7 x 3 x 3 cm. Normal in size and position with no masses.     ENDOMETRIUM: 13 mm. IUD in endometrial cavity. Focal complex fluid or  thickening at the fundus.     RIGHT OVARY: 2 x 1.4 x 1 cm. Normal with flow demonstrated.     LEFT OVARY: 2.4 x 2.2 x 1.9 cm. Normal with flow demonstrated.     No significant free fluid.                                                                      IMPRESSION:    1. Focal abnormality in the fundus of the endometrial cavity does not  have color Doppler flow and most likely represents blood clot. Focal  thickening or polyp not excluded.  2. IUD appropriately positioned.       Review of Systems   Constitutional, HEENT, cardiovascular, pulmonary, gi and gu systems are negative, except as otherwise noted.      Objective    BP (!) 143/91 (BP Location: Right arm, Patient Position: Sitting, Cuff Size: Adult Regular)   Pulse 102   Ht 1.664 m (5' 5.5\")   Wt 71.4 kg (157 lb 6.4 oz)   SpO2 94%   BMI 25.79 kg/m    Body mass index is 25.79 kg/m .  Physical Exam   GENERAL: healthy, alert and no distress   (female): normal female external genitalia, normal urethral meatus, vaginal mucosa, normal cervix/adnexa/uterus without masses or discharge. IUD strings visible and appropriate length.  MS: no gross musculoskeletal defects noted, no edema  SKIN: no suspicious lesions or rashes  PSYCH: mentation appears normal, affect normal/bright    Results for orders placed or performed in visit on 06/20/23 (from the past 24 hour(s))   Wet preparation    Specimen: Vagina; Swab   Result Value Ref Range    Trichomonas Present (A) Absent    Yeast Absent " Absent    Clue Cells Absent Absent    WBCs/high power field 3+ (A) None

## 2023-06-20 ENCOUNTER — OFFICE VISIT (OUTPATIENT)
Dept: OBGYN | Facility: CLINIC | Age: 23
End: 2023-06-20
Payer: COMMERCIAL

## 2023-06-20 VITALS
HEIGHT: 66 IN | OXYGEN SATURATION: 94 % | DIASTOLIC BLOOD PRESSURE: 91 MMHG | SYSTOLIC BLOOD PRESSURE: 143 MMHG | BODY MASS INDEX: 25.3 KG/M2 | HEART RATE: 102 BPM | WEIGHT: 157.4 LBS

## 2023-06-20 DIAGNOSIS — A59.9 TRICHOMONAS INFECTION: ICD-10-CM

## 2023-06-20 DIAGNOSIS — Z97.5 BREAKTHROUGH BLEEDING WITH IUD: Primary | ICD-10-CM

## 2023-06-20 DIAGNOSIS — A74.9 CHLAMYDIA INFECTION: ICD-10-CM

## 2023-06-20 DIAGNOSIS — N92.1 BREAKTHROUGH BLEEDING WITH IUD: Primary | ICD-10-CM

## 2023-06-20 LAB
C TRACH DNA SPEC QL NAA+PROBE: POSITIVE
CLUE CELLS: ABNORMAL
N GONORRHOEA DNA SPEC QL NAA+PROBE: NEGATIVE
TRICHOMONAS, WET PREP: PRESENT
WBC'S/HIGH POWER FIELD, WET PREP: ABNORMAL
YEAST, WET PREP: ABNORMAL

## 2023-06-20 PROCEDURE — 87210 SMEAR WET MOUNT SALINE/INK: CPT | Performed by: NURSE PRACTITIONER

## 2023-06-20 PROCEDURE — 87591 N.GONORRHOEAE DNA AMP PROB: CPT | Performed by: NURSE PRACTITIONER

## 2023-06-20 PROCEDURE — 99213 OFFICE O/P EST LOW 20 MIN: CPT | Performed by: NURSE PRACTITIONER

## 2023-06-20 PROCEDURE — 87491 CHLMYD TRACH DNA AMP PROBE: CPT | Performed by: NURSE PRACTITIONER

## 2023-06-20 RX ORDER — METRONIDAZOLE 500 MG/1
500 TABLET ORAL 2 TIMES DAILY
Qty: 14 TABLET | Refills: 0 | Status: SHIPPED | OUTPATIENT
Start: 2023-06-20 | End: 2023-06-27

## 2023-06-20 NOTE — PATIENT INSTRUCTIONS
If you have any questions regarding your visit, Please contact your care team.     [employer] Services: [phone]  To Schedule an Appointment 24/7  Call: [phone] HOURS TELEPHONE NUMBER     Josie Angeles- APRN CNP      Gregg De Souza-Surgery Scheduler  [facility]-Surgery Scheduler         Monday 7:30am-2:00pm    Tuesday 7:30am-4:00pm    Wednesday 7:30am-2:00pm    Thursday 7:30am-11:00am    Friday 7:30am-2:00pm [place] [address] [place] [address] [phone] ask for [facility]  [phone] Fax    Imaging Scheduling all locations  [phone]    [facility] Labor and Delivery  [address] [place] [address] Dr.  [place], [place] [id number] [phone]         Urgent Care locations:  [facility]   Monday-Friday  10 am - 8 pm  Saturday and Sunday   9 am - 5 pm     [phone]   If you need a medication refill, please contact your pharmacy. Please allow 3 business days for your refill to be completed.  As always, Thank you for trusting us with your healthcare needs!      see additional instructions from your care team below

## 2023-06-20 NOTE — LETTER
June 26, 2023      Alfreda Hernandez  [address]  [place] [address]        Dear ,    We are writing to inform you of your test results.    ***    Resulted Orders   Wet preparation   Result Value Ref Range    Trichomonas Present (A) Absent    Yeast Absent Absent    Clue Cells Absent Absent    WBCs/high power field 3+ (A) None   Chlamydia trachomatis PCR   Result Value Ref Range    Chlamydia trachomatis Positive (A) Negative      Comment:      Positive for C. trachomatis rRNA by transcription mediated amplification.  As is true for all non-culture methods, a positive specimen obtained from a patient after therapeutic treatment cannot be interpreted as indicating the presence of viable organism.   Neisseria gonorrhoeae PCR   Result Value Ref Range    Neisseria gonorrhoeae Negative Negative      Comment:      Negative for N. gonorrhoeae rRNA by transcription mediated amplification. A negative result by transcription mediated amplification does not preclude the presence of C. trachomatis infection because results are dependent on proper and adequate collection, absence of inhibitors and sufficient rRNA to be detected.       If you have any questions or concerns, please call the clinic at the number listed above.       Sincerely,      CHEKO Schmitt CNP

## 2023-06-21 RX ORDER — DOXYCYCLINE 100 MG/1
100 CAPSULE ORAL 2 TIMES DAILY
Qty: 14 CAPSULE | Refills: 0 | Status: SHIPPED | OUTPATIENT
Start: 2023-06-21

## 2023-06-23 ENCOUNTER — MYC MEDICAL ADVICE (OUTPATIENT)
Dept: OBGYN | Facility: CLINIC | Age: 23
End: 2023-06-23

## 2023-06-23 DIAGNOSIS — A54.9 GONORRHEA: Primary | ICD-10-CM

## 2023-06-23 DIAGNOSIS — A74.9 CHLAMYDIA: ICD-10-CM

## 2023-07-30 NOTE — PROGRESS NOTES
"Gynecology Visit Note  7/31/2023    Reason for visit: STI testing    S: Patient is a 23 yo nulligravid female who was seen on outside OBGYN clinic on 6/20/2023 for concerns of breakthrough bleeding with IUD.  At that time patient with positive testing for both trichomonas and chlamydia.  Was given treatment of oral doxycylcine and metronidazole BID x 7 days, that she completed without issue.  Today, she presents with complaints of a white watery vaginal discharge, for a period of one week, with no associated irritation or pain. She is concerned that she has noticed a pungent odor from her genitalia within the last week. She denies any skin changes in the surrounding area, fever, chills or redness. Her primary concern today is that she has been reinfected or her previous antibiotic was ineffective in treating her trichomonas infection.       O:  /89 (BP Location: Right arm, Patient Position: Chair)   Pulse 91   Ht 1.664 m (5' 5.5\")   Wt 73.2 kg (161 lb 4.8 oz)   BMI 26.43 kg/m       General: NAD, A&OX3, afebrile  Resp: Nonlabored breathing  Pelvic: scant white watery discharge present, pungent odor, normal appearing vulva and vaginal canal on speculum exam    - wet mount performed today     -positive for clue cells     -negative for trichomoniasis    -negative for yeast    -pH: 6.0    A/P: 23 yo nulligravid female presents for STI testing. Given her previous history of antibiotic use, her symptom presentation and her wet mount today  that was negative for trichomonatodes, the suspected diagonisis is bacterial vaginosis vs less likely trichomoniasias or other STI   1) STI testing:    - NAAT for Chlamydia, Gonorrhea, ordered today    - HIV Antigen testing ordered   - RPR ordered    - Hep B & C ordered  - Wet mount performed today   2) Bacterial vaginosis: Metronidazole (500mg) ordered today BID for 7 day course   3) Plan to contact patient via [employer] with any concerning lab results      Shahzad Banks, " MS3    Pt seen and discussed with Dr. Rosado.     Physician Attestation     I, Mamie Rosado, was present with the medical student who participated in the service and in the documentation of the note. I have verified the history and personally performed the physical exam and medical decision making. I agree with the assessment and plan of care as documented in the note.       Mamie Rosado MD

## 2023-07-31 ENCOUNTER — OFFICE VISIT (OUTPATIENT)
Dept: OBGYN | Facility: CLINIC | Age: 23
End: 2023-07-31
Attending: OBSTETRICS & GYNECOLOGY
Payer: COMMERCIAL

## 2023-07-31 ENCOUNTER — TRANSFERRED RECORDS (OUTPATIENT)
Dept: HEALTH INFORMATION MANAGEMENT | Facility: CLINIC | Age: 23
End: 2023-07-31

## 2023-07-31 VITALS
WEIGHT: 161.3 LBS | SYSTOLIC BLOOD PRESSURE: 137 MMHG | HEART RATE: 91 BPM | DIASTOLIC BLOOD PRESSURE: 89 MMHG | HEIGHT: 66 IN | BODY MASS INDEX: 25.92 KG/M2

## 2023-07-31 DIAGNOSIS — Z11.3 ROUTINE SCREENING FOR STI (SEXUALLY TRANSMITTED INFECTION): Primary | ICD-10-CM

## 2023-07-31 DIAGNOSIS — B96.89 BACTERIAL VAGINOSIS: ICD-10-CM

## 2023-07-31 DIAGNOSIS — N76.0 BACTERIAL VAGINOSIS: ICD-10-CM

## 2023-07-31 PROCEDURE — 99214 OFFICE O/P EST MOD 30 MIN: CPT | Performed by: OBSTETRICS & GYNECOLOGY

## 2023-07-31 PROCEDURE — G0463 HOSPITAL OUTPT CLINIC VISIT: HCPCS | Performed by: OBSTETRICS & GYNECOLOGY

## 2023-07-31 PROCEDURE — 87491 CHLMYD TRACH DNA AMP PROBE: CPT | Performed by: OBSTETRICS & GYNECOLOGY

## 2023-07-31 PROCEDURE — 87591 N.GONORRHOEAE DNA AMP PROB: CPT | Performed by: OBSTETRICS & GYNECOLOGY

## 2023-07-31 RX ORDER — METRONIDAZOLE 500 MG/1
500 TABLET ORAL 2 TIMES DAILY
Qty: 14 TABLET | Refills: 0 | Status: SHIPPED | OUTPATIENT
Start: 2023-07-31 | End: 2023-08-07

## 2023-07-31 NOTE — NURSING NOTE
Chief Complaint   Patient presents with    STD     Pt here for STI testing. She was treated 1 month ago for trich. She did not have symptoms then. Now after completion of tx, she is noticing odor, increased vaginal discharge.       See NANCY Babcock 7/31/2023  
16.6

## 2023-07-31 NOTE — LETTER
"7/31/2023       RE: Alfreda Hernadnez  [address]  [place] [address]     Dear Colleague,    Thank you for referring your patient, Alfreda Hernandez, to the [facility] [place] [facility]. Please see a copy of my visit note below.    Gynecology Visit Note  7/31/2023    Reason for visit: STI testing    S: Patient is a 21 yo nulligravid female who was seen on outside OBGYN clinic on 6/20/2023 for concerns of breakthrough bleeding with IUD.  At that time patient with positive testing for both trichomonas and chlamydia.  Was given treatment of oral doxycylcine and metronidazole BID x 7 days, that she completed without issue.  Today, she presents with complaints of a white watery vaginal discharge, for a period of one week, with no associated irritation or pain. She is concerned that she has noticed a pungent odor from her genitalia within the last week. She denies any skin changes in the surrounding area, fever, chills or redness. Her primary concern today is that she has been reinfected or her previous antibiotic was ineffective in treating her trichomonas infection.       O:  /89 (BP Location: Right arm, Patient Position: Chair)   Pulse 91   Ht 1.664 m (5' 5.5\")   Wt 73.2 kg (161 lb 4.8 oz)   BMI 26.43 kg/m       General: NAD, A&OX3, afebrile  Resp: Nonlabored breathing  Pelvic: scant white watery discharge present, pungent odor, normal appearing vulva and vaginal canal on speculum exam    - wet mount performed today     -positive for clue cells     -negative for trichomoniasis    -negative for yeast    -pH: 6.0    A/P: 21 yo nulligravid female presents for STI testing. Given her previous history of antibiotic use, her symptom presentation and her wet mount today  that was negative for trichomonatodes, the suspected diagonisis is bacterial vaginosis vs less likely trichomoniasias or other STI   1) STI testing:    - NAAT for " Chlamydia, Gonorrhea, ordered today    - HIV Antigen testing ordered   - RPR ordered    - Hep B & C ordered  - Wet mount performed today   2) Bacterial vaginosis: Metronidazole (500mg) ordered today BID for 7 day course   3) Plan to contact patient via [employer] with any concerning lab results      Shahzad Banks, MS3    Pt seen and discussed with Dr. Rosado.     Physician Attestation     I, Mamie Rosado, was present with the medical student who participated in the service and in the documentation of the note. I have verified the history and personally performed the physical exam and medical decision making. I agree with the assessment and plan of care as documented in the note.       Mamie Rosado MD

## 2023-07-31 NOTE — PATIENT INSTRUCTIONS
Thank you for trusting us with your care!     If you need to contact us for questions about:  Symptoms, Scheduling & Medical Questions; Non-urgent (2-3 day response) Reji message, Urgent (needing response today) [phone] (if after 3:30pm next day response)   Prescriptions: Please call your Pharmacy   Billing: [facility] [phone] or [facility]:[phone]

## 2023-08-01 LAB
C TRACH DNA SPEC QL NAA+PROBE: POSITIVE
N GONORRHOEA DNA SPEC QL NAA+PROBE: POSITIVE

## 2023-08-01 RX ORDER — DOXYCYCLINE 100 MG/1
100 CAPSULE ORAL 2 TIMES DAILY
Qty: 14 CAPSULE | Refills: 0 | Status: SHIPPED | OUTPATIENT
Start: 2023-08-01 | End: 2023-08-08

## 2023-08-01 NOTE — TELEPHONE ENCOUNTER
"Called patient per Dr. Rosado's message below:    \"...She came in yesterday to see me with concerns that she had recurrent symptoms...She has a positive chlamydia and gonorrhea test from yesterday.  Can you please call her to arrange for a RN visit ASAP to get Ceftriaxone 500 mg IM injection and then also send in doxycycline 100 mg bid x 7 days to her requested pharmacy?  Please let her know that her partner will need to be evaluated and treated as well and she should not have intercourse during treatment and use condoms for prevention moving forward.  Can you ask her if she would open to repeat visit in 4 weeks to ensure resolution of infection?  If so, can you help her schedule this?\"    Ceftriaxone and Doxycycline ordered per provider, scheduled for visit today. Called patient regarding results and recommended treatment. Patient declines assistance with getting partner treatment at this time, verbalizes understanding of need for partner treatment. Discussed recommendation for no intercourse during treatment, condom use moving forward, repeat visit in 4 weeks to ensure resolution, patient verbalized understanding. Answered all patient questions at this time. Scheduled for return visit.     MD form filled out and faxed per protocol.  "

## 2023-08-04 ENCOUNTER — ALLIED HEALTH/NURSE VISIT (OUTPATIENT)
Dept: OBGYN | Facility: CLINIC | Age: 23
End: 2023-08-04
Payer: COMMERCIAL

## 2023-08-04 DIAGNOSIS — A54.9 GONORRHEA: ICD-10-CM

## 2023-08-04 DIAGNOSIS — A74.9 CHLAMYDIA: Primary | ICD-10-CM

## 2023-08-04 PROCEDURE — 250N000011 HC RX IP 250 OP 636: Mod: JZ | Performed by: OBSTETRICS & GYNECOLOGY

## 2023-08-04 PROCEDURE — 96372 THER/PROPH/DIAG INJ SC/IM: CPT | Performed by: OBSTETRICS & GYNECOLOGY

## 2023-08-04 RX ADMIN — CEFTRIAXONE SODIUM 500 MG: 250 INJECTION, POWDER, FOR SOLUTION INTRAMUSCULAR; INTRAVENOUS at 15:05

## 2023-08-04 NOTE — NURSING NOTE
Chief Complaint   Patient presents with    Allied Health Visit     Rocephin 500 mg     Clinic Administered Medication Documentation      Injectable Medication Documentation    Is there an active order (written within the past 365 days, with administrations remaining, not ) in the chart? Yes.     Patient was given  Rocephin 500 mg . Prior to medication administration, verified patient's identity using patient s name and date of birth. Please see MAR and medication order for additional information. Patient instructed to remain in clinic for 15 minutes and report any adverse reaction to staff immediately.    Vial/Syringe: Single dose vial. Was entire vial of medication used? Yes  Was this medication supplied by the patient? No  Is this a medication the patient will need to receive again? No - not necessary to check for refills remaining.

## 2023-11-28 ENCOUNTER — E-VISIT (OUTPATIENT)
Dept: URGENT CARE | Facility: CLINIC | Age: 23
End: 2023-11-28
Payer: COMMERCIAL

## 2023-11-28 DIAGNOSIS — H10.30 ACUTE BACTERIAL CONJUNCTIVITIS, UNSPECIFIED LATERALITY: Primary | ICD-10-CM

## 2023-11-28 PROCEDURE — 99207 PR NO CHARGE LOS: CPT | Performed by: PHYSICIAN ASSISTANT

## 2023-11-28 RX ORDER — POLYMYXIN B SULFATE AND TRIMETHOPRIM 1; 10000 MG/ML; [USP'U]/ML
SOLUTION OPHTHALMIC
Qty: 10 ML | Refills: 0 | Status: SHIPPED | OUTPATIENT
Start: 2023-11-28 | End: 2023-12-05

## 2023-11-28 NOTE — PATIENT INSTRUCTIONS
Thank you for choosing us for your care. I have placed an order for a prescription so that you can start treatment. View your full visit summary for details by clicking on the link below. Your pharmacist will able to address any questions you may have about the medication.     If you re not feeling better within 2-3 days, please schedule an appointment.  You can schedule an appointment right here in [facility], or call [phone]  If the visit is for the same symptoms as your eVisit, we ll refund the cost of your eVisit if seen within seven days.    Pinkeye: Care Instructions  Overview     Pinkeye is redness and swelling of the eye surface and the conjunctiva (the lining of the eyelid and the covering of the white part of the eye). Pinkeye is also called conjunctivitis. Pinkeye is often caused by infection with bacteria or a virus. Dry air, allergies, smoke, and chemicals are other common causes.  Pinkeye often gets better on its own in 7 to 10 days. Antibiotics only help if the pinkeye is caused by bacteria. Pinkeye caused by infection spreads easily. If an allergy or chemical is causing pinkeye, it will not go away unless you can avoid whatever is causing it.  Follow-up care is a key part of your treatment and safety. Be sure to make and go to all appointments, and call your doctor if you are having problems. It's also a good idea to know your test results and keep a list of the medicines you take.  How can you care for yourself at home?  Wash your hands often. Always wash them before and after you treat pinkeye or touch your eyes or face.  Use moist cotton or a clean, wet cloth to remove crust. Wipe from the inside corner of the eye to the outside. Use a clean part of the cloth for each wipe.  Put cold or warm wet cloths on your eye a few times a day if the eye hurts.  Do not wear contact lenses or eye makeup until the pinkeye is gone. Throw away any eye makeup you were using when you got pinkeye. Clean your  "contacts and storage case. If you wear disposable contacts, use a new pair when your eye has cleared and it is safe to wear contacts again.  If the doctor gave you antibiotic ointment or eyedrops, use them as directed. Use the medicine for as long as instructed, even if your eye starts looking better soon. Keep the bottle tip clean, and do not let it touch the eye area.  To put in eyedrops or ointment:  Tilt your head back, and pull your lower eyelid down with one finger.  Drop or squirt the medicine inside the lower lid.  Close your eye for 30 to 60 seconds to let the drops or ointment move around.  Do not touch the ointment or dropper tip to your eyelashes or any other surface.  Do not share towels, pillows, or washcloths while you have pinkeye.  When should you call for help?   Call your doctor now or seek immediate medical care if:    You have pain in your eye, not just irritation on the surface.     You have a change in vision or loss of vision.     You have an increase in discharge from the eye.     Your eye has not started to improve or begins to get worse within 48 hours after you start using antibiotics.     Pinkeye lasts longer than 7 days.   Watch closely for changes in your health, and be sure to contact your doctor if you have any problems.  Where can you learn more?  Go to [email or web address]/patiented  Enter Y392 in the search box to learn more about \"Pinkeye: Care Instructions.\"  Current as of: June 6, 2023               Content Version: 13.8    5951-3183 [employer].   Care instructions adapted under license by your healthcare professional. If you have questions about a medical condition or this instruction, always ask your healthcare professional. [employer] disclaims any warranty or liability for your use of this information.       Taking Care of Pinkeye at Home (01:30)  Your health professional recommends that you watch this short online health video.  Learn ways " to ease the discomfort of pinkeye and keep the infection from spreading.  Purpose:  Describes basic home care for pinkeye to ease discomfort and prevent the spread of the infection.  Goal:  User will learn home treatment for pinkeye.     How to watch the video    Scan the QR code   OR Visit the website    [email or web address]   Current as of: June 6, 2023               Content Version: 13.8    9075-3397 [employer].   Care instructions adapted under license by your healthcare professional. If you have questions about a medical condition or this instruction, always ask your healthcare professional. [employer] disclaims any warranty or liability for your use of this information.

## 2024-06-22 ENCOUNTER — HEALTH MAINTENANCE LETTER (OUTPATIENT)
Age: 24
End: 2024-06-22

## 2025-07-12 ENCOUNTER — HEALTH MAINTENANCE LETTER (OUTPATIENT)
Age: 25
End: 2025-07-12